# Patient Record
Sex: FEMALE | Race: BLACK OR AFRICAN AMERICAN | Employment: OTHER | URBAN - METROPOLITAN AREA
[De-identification: names, ages, dates, MRNs, and addresses within clinical notes are randomized per-mention and may not be internally consistent; named-entity substitution may affect disease eponyms.]

---

## 2017-01-13 ENCOUNTER — HOSPITAL ENCOUNTER (INPATIENT)
Age: 62
LOS: 7 days | Discharge: HOME | DRG: 418 | End: 2017-01-20
Attending: EMERGENCY MEDICINE | Admitting: INTERNAL MEDICINE
Payer: COMMERCIAL

## 2017-01-13 ENCOUNTER — APPOINTMENT (OUTPATIENT)
Dept: CT IMAGING | Age: 62
DRG: 418 | End: 2017-01-13
Attending: EMERGENCY MEDICINE
Payer: COMMERCIAL

## 2017-01-13 DIAGNOSIS — R10.13 ABDOMINAL PAIN, EPIGASTRIC: ICD-10-CM

## 2017-01-13 DIAGNOSIS — N30.00 ACUTE CYSTITIS WITHOUT HEMATURIA: ICD-10-CM

## 2017-01-13 DIAGNOSIS — R11.2 NON-INTRACTABLE VOMITING WITH NAUSEA, UNSPECIFIED VOMITING TYPE: Primary | ICD-10-CM

## 2017-01-13 DIAGNOSIS — N28.9 ACUTE RENAL INSUFFICIENCY: ICD-10-CM

## 2017-01-13 PROBLEM — N39.0 UTI (URINARY TRACT INFECTION): Status: ACTIVE | Noted: 2017-01-13

## 2017-01-13 LAB
ALBUMIN SERPL BCP-MCNC: 4 G/DL (ref 3.5–5)
ALBUMIN/GLOB SERPL: 1 {RATIO} (ref 1.1–2.2)
ALP SERPL-CCNC: 130 U/L (ref 45–117)
ALT SERPL-CCNC: 22 U/L (ref 12–78)
ANION GAP BLD CALC-SCNC: 12 MMOL/L (ref 5–15)
APPEARANCE UR: ABNORMAL
AST SERPL W P-5'-P-CCNC: 17 U/L (ref 15–37)
BACTERIA URNS QL MICRO: ABNORMAL /HPF
BASOPHILS # BLD AUTO: 0.1 K/UL (ref 0–0.1)
BASOPHILS # BLD: 1 % (ref 0–1)
BILIRUB SERPL-MCNC: 0.9 MG/DL (ref 0.2–1)
BILIRUB UR QL: NEGATIVE
BUN SERPL-MCNC: 25 MG/DL (ref 6–20)
BUN/CREAT SERPL: 11 (ref 12–20)
CALCIUM SERPL-MCNC: 8.6 MG/DL (ref 8.5–10.1)
CHLORIDE SERPL-SCNC: 103 MMOL/L (ref 97–108)
CO2 SERPL-SCNC: 24 MMOL/L (ref 21–32)
COLOR UR: ABNORMAL
CREAT SERPL-MCNC: 2.18 MG/DL (ref 0.55–1.02)
EOSINOPHIL # BLD: 0.1 K/UL (ref 0–0.4)
EOSINOPHIL NFR BLD: 1 % (ref 0–7)
EPITH CASTS URNS QL MICRO: ABNORMAL /LPF
ERYTHROCYTE [DISTWIDTH] IN BLOOD BY AUTOMATED COUNT: 13.1 % (ref 11.5–14.5)
GLOBULIN SER CALC-MCNC: 4.2 G/DL (ref 2–4)
GLUCOSE SERPL-MCNC: 90 MG/DL (ref 65–100)
GLUCOSE UR STRIP.AUTO-MCNC: NEGATIVE MG/DL
HCT VFR BLD AUTO: 40.7 % (ref 35–47)
HGB BLD-MCNC: 13.7 G/DL (ref 11.5–16)
HGB UR QL STRIP: ABNORMAL
KETONES UR QL STRIP.AUTO: ABNORMAL MG/DL
LEUKOCYTE ESTERASE UR QL STRIP.AUTO: ABNORMAL
LIPASE SERPL-CCNC: 122 U/L (ref 73–393)
LYMPHOCYTES # BLD AUTO: 31 % (ref 12–49)
LYMPHOCYTES # BLD: 2.7 K/UL (ref 0.8–3.5)
MCH RBC QN AUTO: 29.5 PG (ref 26–34)
MCHC RBC AUTO-ENTMCNC: 33.7 G/DL (ref 30–36.5)
MCV RBC AUTO: 87.5 FL (ref 80–99)
MONOCYTES # BLD: 0.7 K/UL (ref 0–1)
MONOCYTES NFR BLD AUTO: 8 % (ref 5–13)
NEUTS SEG # BLD: 5.3 K/UL (ref 1.8–8)
NEUTS SEG NFR BLD AUTO: 59 % (ref 32–75)
NITRITE UR QL STRIP.AUTO: NEGATIVE
PH UR STRIP: 6 [PH] (ref 5–8)
PLATELET # BLD AUTO: 192 K/UL (ref 150–400)
POTASSIUM SERPL-SCNC: 3.7 MMOL/L (ref 3.5–5.1)
PROT SERPL-MCNC: 8.2 G/DL (ref 6.4–8.2)
PROT UR STRIP-MCNC: ABNORMAL MG/DL
RBC # BLD AUTO: 4.65 M/UL (ref 3.8–5.2)
RBC #/AREA URNS HPF: ABNORMAL /HPF (ref 0–5)
SODIUM SERPL-SCNC: 139 MMOL/L (ref 136–145)
SP GR UR REFRACTOMETRY: 1.03 (ref 1–1.03)
TROPONIN I SERPL-MCNC: <0.04 NG/ML
UA: UC IF INDICATED,UAUC: ABNORMAL
UROBILINOGEN UR QL STRIP.AUTO: 1 EU/DL (ref 0.2–1)
WBC # BLD AUTO: 8.9 K/UL (ref 3.6–11)
WBC URNS QL MICRO: ABNORMAL /HPF (ref 0–4)

## 2017-01-13 PROCEDURE — 74011250637 HC RX REV CODE- 250/637: Performed by: EMERGENCY MEDICINE

## 2017-01-13 PROCEDURE — 36415 COLL VENOUS BLD VENIPUNCTURE: CPT | Performed by: INTERNAL MEDICINE

## 2017-01-13 PROCEDURE — 74011000250 HC RX REV CODE- 250: Performed by: EMERGENCY MEDICINE

## 2017-01-13 PROCEDURE — 74176 CT ABD & PELVIS W/O CONTRAST: CPT

## 2017-01-13 PROCEDURE — 74011250636 HC RX REV CODE- 250/636: Performed by: INTERNAL MEDICINE

## 2017-01-13 PROCEDURE — 87086 URINE CULTURE/COLONY COUNT: CPT | Performed by: EMERGENCY MEDICINE

## 2017-01-13 PROCEDURE — 85025 COMPLETE CBC W/AUTO DIFF WBC: CPT | Performed by: EMERGENCY MEDICINE

## 2017-01-13 PROCEDURE — 74011250637 HC RX REV CODE- 250/637: Performed by: INTERNAL MEDICINE

## 2017-01-13 PROCEDURE — 81001 URINALYSIS AUTO W/SCOPE: CPT | Performed by: EMERGENCY MEDICINE

## 2017-01-13 PROCEDURE — 96374 THER/PROPH/DIAG INJ IV PUSH: CPT

## 2017-01-13 PROCEDURE — 80053 COMPREHEN METABOLIC PANEL: CPT | Performed by: EMERGENCY MEDICINE

## 2017-01-13 PROCEDURE — 99284 EMERGENCY DEPT VISIT MOD MDM: CPT

## 2017-01-13 PROCEDURE — 96361 HYDRATE IV INFUSION ADD-ON: CPT

## 2017-01-13 PROCEDURE — 74011636320 HC RX REV CODE- 636/320: Performed by: EMERGENCY MEDICINE

## 2017-01-13 PROCEDURE — 65270000029 HC RM PRIVATE

## 2017-01-13 PROCEDURE — 74011250636 HC RX REV CODE- 250/636: Performed by: EMERGENCY MEDICINE

## 2017-01-13 PROCEDURE — 74011000258 HC RX REV CODE- 258: Performed by: EMERGENCY MEDICINE

## 2017-01-13 PROCEDURE — 83690 ASSAY OF LIPASE: CPT | Performed by: EMERGENCY MEDICINE

## 2017-01-13 PROCEDURE — 84484 ASSAY OF TROPONIN QUANT: CPT | Performed by: EMERGENCY MEDICINE

## 2017-01-13 PROCEDURE — 96375 TX/PRO/DX INJ NEW DRUG ADDON: CPT

## 2017-01-13 RX ORDER — HEPARIN SODIUM 5000 [USP'U]/ML
5000 INJECTION, SOLUTION INTRAVENOUS; SUBCUTANEOUS EVERY 8 HOURS
Status: DISCONTINUED | OUTPATIENT
Start: 2017-01-13 | End: 2017-01-20 | Stop reason: HOSPADM

## 2017-01-13 RX ORDER — SODIUM CHLORIDE 9 MG/ML
100 INJECTION, SOLUTION INTRAVENOUS CONTINUOUS
Status: DISCONTINUED | OUTPATIENT
Start: 2017-01-13 | End: 2017-01-13

## 2017-01-13 RX ORDER — FAMOTIDINE 10 MG/ML
20 INJECTION INTRAVENOUS
Status: COMPLETED | OUTPATIENT
Start: 2017-01-13 | End: 2017-01-13

## 2017-01-13 RX ORDER — ONDANSETRON 2 MG/ML
4 INJECTION INTRAMUSCULAR; INTRAVENOUS
Status: DISCONTINUED | OUTPATIENT
Start: 2017-01-13 | End: 2017-01-20 | Stop reason: HOSPADM

## 2017-01-13 RX ORDER — MORPHINE SULFATE 2 MG/ML
4 INJECTION, SOLUTION INTRAMUSCULAR; INTRAVENOUS
Status: COMPLETED | OUTPATIENT
Start: 2017-01-13 | End: 2017-01-13

## 2017-01-13 RX ORDER — HYDRALAZINE HYDROCHLORIDE 20 MG/ML
10 INJECTION INTRAMUSCULAR; INTRAVENOUS
Status: DISCONTINUED | OUTPATIENT
Start: 2017-01-13 | End: 2017-01-20 | Stop reason: HOSPADM

## 2017-01-13 RX ORDER — SODIUM CHLORIDE 0.9 % (FLUSH) 0.9 %
5-10 SYRINGE (ML) INJECTION EVERY 8 HOURS
Status: DISCONTINUED | OUTPATIENT
Start: 2017-01-13 | End: 2017-01-14

## 2017-01-13 RX ORDER — SODIUM CHLORIDE 9 MG/ML
100 INJECTION, SOLUTION INTRAVENOUS CONTINUOUS
Status: DISCONTINUED | OUTPATIENT
Start: 2017-01-13 | End: 2017-01-19

## 2017-01-13 RX ORDER — ONDANSETRON 2 MG/ML
4 INJECTION INTRAMUSCULAR; INTRAVENOUS
Status: COMPLETED | OUTPATIENT
Start: 2017-01-13 | End: 2017-01-13

## 2017-01-13 RX ORDER — GABAPENTIN 100 MG/1
100 CAPSULE ORAL 3 TIMES DAILY
Status: DISCONTINUED | OUTPATIENT
Start: 2017-01-13 | End: 2017-01-20 | Stop reason: HOSPADM

## 2017-01-13 RX ORDER — SODIUM CHLORIDE 0.9 % (FLUSH) 0.9 %
5-10 SYRINGE (ML) INJECTION AS NEEDED
Status: DISCONTINUED | OUTPATIENT
Start: 2017-01-13 | End: 2017-01-20 | Stop reason: HOSPADM

## 2017-01-13 RX ADMIN — ONDANSETRON 4 MG: 2 INJECTION INTRAMUSCULAR; INTRAVENOUS at 23:53

## 2017-01-13 RX ADMIN — Medication 4 MG: at 17:13

## 2017-01-13 RX ADMIN — ONDANSETRON 4 MG: 2 INJECTION INTRAMUSCULAR; INTRAVENOUS at 22:11

## 2017-01-13 RX ADMIN — ONDANSETRON HYDROCHLORIDE 4 MG: 2 INJECTION, SOLUTION INTRAVENOUS at 17:13

## 2017-01-13 RX ADMIN — GABAPENTIN 100 MG: 100 CAPSULE ORAL at 22:55

## 2017-01-13 RX ADMIN — SODIUM CHLORIDE 100 ML/HR: 900 INJECTION, SOLUTION INTRAVENOUS at 22:51

## 2017-01-13 RX ADMIN — FAMOTIDINE 20 MG: 10 INJECTION, SOLUTION INTRAVENOUS at 22:15

## 2017-01-13 RX ADMIN — LIDOCAINE HYDROCHLORIDE 40 ML: 20 SOLUTION ORAL; TOPICAL at 22:09

## 2017-01-13 RX ADMIN — Medication 4 MG: at 22:21

## 2017-01-13 RX ADMIN — HEPARIN SODIUM 5000 UNITS: 5000 INJECTION, SOLUTION INTRAVENOUS; SUBCUTANEOUS at 22:17

## 2017-01-13 RX ADMIN — SODIUM CHLORIDE 1000 ML: 900 INJECTION, SOLUTION INTRAVENOUS at 17:13

## 2017-01-13 RX ADMIN — DIATRIZOATE MEGLUMINE AND DIATRIZOATE SODIUM 30 ML: 600; 100 SOLUTION ORAL; RECTAL at 17:13

## 2017-01-13 RX ADMIN — CEFTRIAXONE 1 G: 1 INJECTION, POWDER, FOR SOLUTION INTRAMUSCULAR; INTRAVENOUS at 22:20

## 2017-01-13 NOTE — ED NOTES
Assumed care of patient, Dr Colleen Liao bedside evaluating patient-- pt to change into gown, urine specimen sent to lab, plan of care include IV and fluids, possible CT scan- pt prefers that her  who is with her remains in the waiting room   +

## 2017-01-13 NOTE — IP AVS SNAPSHOT
Current Discharge Medication List  
  
Take these medications at their scheduled times Dose & Instructions Dispensing Information Comments Morning Noon Evening Bedtime AVAPRO 300 mg tablet Generic drug:  irbesartan Your next dose is: Today, Tomorrow Other:  ____________ Dose:  300 mg Take 300 mg by mouth daily. Refills:  0  
     
   
   
   
  
 docusate sodium 100 mg capsule Commonly known as:  Ledell Sadie Your next dose is: Today, Tomorrow Other:  ____________ Dose:  100 mg Take 1 Cap by mouth two (2) times a day for 14 days. Stop taking if you have diarrhea Quantity:  30 Cap Refills:  2  
     
   
   
   
  
 gabapentin 100 mg capsule Commonly known as:  NEURONTIN Your next dose is: Today, Tomorrow Other:  ____________ Dose:  100 mg Take 100 mg by mouth three (3) times daily. Refills:  0 PREVACID 30 mg capsule Generic drug:  lansoprazole Your next dose is: Today, Tomorrow Other:  ____________ Dose:  20 mg Take 20 mg by mouth Daily (before breakfast). Refills:  0 Take these medications as needed Dose & Instructions Dispensing Information Comments Morning Noon Evening Bedtime  
 ibuprofen 600 mg tablet Commonly known as:  MOTRIN Your next dose is: Today, Tomorrow Other:  ____________ Dose:  600 mg Take 1 Tab by mouth every six (6) hours as needed for Pain. Quantity:  40 Tab Refills:  0  
     
   
   
   
  
 oxyCODONE-acetaminophen 5-325 mg per tablet Commonly known as:  PERCOCET Your next dose is: Today, Tomorrow Other:  ____________ Dose:  1-2 Tab Take 1-2 Tabs by mouth every four (4) hours as needed for Pain. Max Daily Amount: 12 Tabs. Quantity:  40 Tab Refills:  0 Where to Get Your Medications These medications were sent to [facility] at [facility], [address] [facility] [id number], [phone] [id number] Phone:  [phone]  
  docusate sodium 100 mg capsule  
 ibuprofen 600 mg tablet Information about where to get these medications is not yet available ! Ask your nurse or doctor about these medications  
  oxyCODONE-acetaminophen 5-325 mg per tablet

## 2017-01-13 NOTE — ED PROVIDER NOTES
HPI Comments:   Taniya Mart is a 62 y.o. female with a hx of acid reflux, HTN, and fibromyalgia presenting to the ED C/O epigastric abd pain which started 4 days ago. Associated symptoms include chills, nausea, and vomiting. Pt had 5 episodes of vomiting this week and another 3 just after arrival to the ED. She took Zofran just PTA for the symptoms. Pt states her  had similar symptoms 1.5 weeks ago but thought it was because of an allergic reaction to peanuts. PSHx significant for . Patient denies diarrhea, hematochezia, fever, or any other symptoms or complaints. PCP: Christie Harris MD    There are no other complaints, changes or physical findings at this time. Written by CONNIE Pop, as dictated by Sandra Walden MD      The history is provided by the patient. No  was used. Past Medical History:   Diagnosis Date    Gastrointestinal disorder      acid reflux    Hypertension     Other ill-defined conditions(799.89)      fibromyalgia       Past Surgical History:   Procedure Laterality Date    Hx gyn       , tubal ligation    Pr gerd tst w/ mucos impede electrod,>1hr  2016          Pr esophageal motility study w/interp&rpt  2016               History reviewed. No pertinent family history. Social History     Social History    Marital status:      Spouse name: N/A    Number of children: N/A    Years of education: N/A     Occupational History    Not on file. Social History Main Topics    Smoking status: Never Smoker    Smokeless tobacco: Not on file    Alcohol use No    Drug use: Not on file    Sexual activity: Not on file     Other Topics Concern    Not on file     Social History Narrative         ALLERGIES: Pcn [penicillins]    Review of Systems   Constitutional: Positive for chills. Negative for activity change and fever. HENT: Negative for congestion and sore throat.     Eyes: Negative for pain and redness. Respiratory: Negative for cough, chest tightness and shortness of breath. Cardiovascular: Negative for chest pain and palpitations. Gastrointestinal: Positive for abdominal pain (epigastric), nausea and vomiting. Negative for blood in stool and diarrhea. Genitourinary: Negative for dysuria, frequency and urgency. Musculoskeletal: Negative for back pain and neck pain. Skin: Negative for rash. Neurological: Negative for syncope, light-headedness and headaches. Psychiatric/Behavioral: Negative for confusion. All other systems reviewed and are negative. Vitals:    01/13/17 1457 01/13/17 1547 01/13/17 1937   BP: 140/65 125/51 160/86   Pulse: 61 61 (!) 53   Resp: 16 16 16   Temp: 97.4 °F (36.3 °C) 97.9 °F (36.6 °C)    SpO2: 99% 100% 100%   Weight: 82.5 kg (181 lb 14.1 oz)     Height: 5' 7\" (1.702 m)              Physical Exam   Constitutional: She is oriented to person, place, and time. She appears well-developed and well-nourished. No distress. HENT:   Head: Normocephalic. Nose: Nose normal.   Mouth/Throat: Oropharynx is clear and moist. No oropharyngeal exudate. Eyes: Conjunctivae are normal. Pupils are equal, round, and reactive to light. No scleral icterus. Neck: Normal range of motion. Neck supple. No JVD present. No tracheal deviation present. No thyromegaly present. Cardiovascular: Normal rate, regular rhythm and intact distal pulses. Exam reveals no gallop and no friction rub. No murmur heard. Pulmonary/Chest: Effort normal and breath sounds normal. No stridor. No respiratory distress. She has no wheezes. She has no rales. Abdominal: Soft. Bowel sounds are normal. She exhibits no distension. There is tenderness (mild) in the epigastric area. There is guarding (minimal, voluntary). There is no rebound. Musculoskeletal: Normal range of motion. She exhibits no edema. Lymphadenopathy:     She has no cervical adenopathy.    Neurological: She is alert and oriented to person, place, and time. No cranial nerve deficit. She exhibits normal muscle tone. Coordination normal.   Skin: Skin is warm and dry. No rash noted. She is not diaphoretic. No erythema. Psychiatric: She has a normal mood and affect. Her behavior is normal.   Nursing note and vitals reviewed. MDM  Number of Diagnoses or Management Options  Abdominal pain, epigastric:   Acute cystitis without hematuria:   Acute renal insufficiency:   Non-intractable vomiting with nausea, unspecified vomiting type:   Diagnosis management comments: DDx: gastroenteritis, gastritis, pancreatitis, metabolic abnormality       Amount and/or Complexity of Data Reviewed  Clinical lab tests: ordered and reviewed  Tests in the radiology section of CPT®: ordered and reviewed  Discuss the patient with other providers: yes (Hospitalist)    Risk of Complications, Morbidity, and/or Mortality  General comments: Will admit to hospitalist for acute renal insufficiency; most likely prerenal due to n/v and decreased po intake; abd pelvis ct without acute pathology; no increased wbc; +UTI with 10-20 wbc and 2+bacteria;  Jules Wick MD      Patient Progress  Patient progress: stable    Procedures    CONSULT NOTE:   7:55 PM  Jules Wick MD spoke with Dr. Thalia Da Silva,   Specialty: Hospitalist  Discussed pt's hx, disposition, and available diagnostic and imaging results. Reviewed care plans. Consultant will evaluate pt for admission.   Written by Tejal Mccoy ED Scribe, as dictated by Jules Wick MD.    LABORATORY TESTS:  Recent Results (from the past 12 hour(s))   CBC WITH AUTOMATED DIFF    Collection Time: 01/13/17  3:27 PM   Result Value Ref Range    WBC 8.9 3.6 - 11.0 K/uL    RBC 4.65 3.80 - 5.20 M/uL    HGB 13.7 11.5 - 16.0 g/dL    HCT 40.7 35.0 - 47.0 %    MCV 87.5 80.0 - 99.0 FL    MCH 29.5 26.0 - 34.0 PG    MCHC 33.7 30.0 - 36.5 g/dL    RDW 13.1 11.5 - 14.5 %    PLATELET 123 210 - 006 K/uL    NEUTROPHILS 59 32 - 75 % LYMPHOCYTES 31 12 - 49 %    MONOCYTES 8 5 - 13 %    EOSINOPHILS 1 0 - 7 %    BASOPHILS 1 0 - 1 %    ABS. NEUTROPHILS 5.3 1.8 - 8.0 K/UL    ABS. LYMPHOCYTES 2.7 0.8 - 3.5 K/UL    ABS. MONOCYTES 0.7 0.0 - 1.0 K/UL    ABS. EOSINOPHILS 0.1 0.0 - 0.4 K/UL    ABS. BASOPHILS 0.1 0.0 - 0.1 K/UL   METABOLIC PANEL, COMPREHENSIVE    Collection Time: 01/13/17  3:27 PM   Result Value Ref Range    Sodium 139 136 - 145 mmol/L    Potassium 3.7 3.5 - 5.1 mmol/L    Chloride 103 97 - 108 mmol/L    CO2 24 21 - 32 mmol/L    Anion gap 12 5 - 15 mmol/L    Glucose 90 65 - 100 mg/dL    BUN 25 (H) 6 - 20 MG/DL    Creatinine 2.18 (H) 0.55 - 1.02 MG/DL    BUN/Creatinine ratio 11 (L) 12 - 20      GFR est AA 28 (L) >60 ml/min/1.73m2    GFR est non-AA 23 (L) >60 ml/min/1.73m2    Calcium 8.6 8.5 - 10.1 MG/DL    Bilirubin, total 0.9 0.2 - 1.0 MG/DL    ALT 22 12 - 78 U/L    AST 17 15 - 37 U/L    Alk.  phosphatase 130 (H) 45 - 117 U/L    Protein, total 8.2 6.4 - 8.2 g/dL    Albumin 4.0 3.5 - 5.0 g/dL    Globulin 4.2 (H) 2.0 - 4.0 g/dL    A-G Ratio 1.0 (L) 1.1 - 2.2     LIPASE    Collection Time: 01/13/17  3:27 PM   Result Value Ref Range    Lipase 122 73 - 393 U/L   TROPONIN I    Collection Time: 01/13/17  3:27 PM   Result Value Ref Range    Troponin-I, Qt. <0.04 <0.05 ng/mL   URINALYSIS W/ REFLEX CULTURE    Collection Time: 01/13/17  4:55 PM   Result Value Ref Range    Color YELLOW/STRAW      Appearance TURBID (A) CLEAR      Specific gravity 1.026 1.003 - 1.030      pH (UA) 6.0 5.0 - 8.0      Protein TRACE (A) NEG mg/dL    Glucose NEGATIVE  NEG mg/dL    Ketone TRACE (A) NEG mg/dL    Bilirubin NEGATIVE  NEG      Blood TRACE (A) NEG      Urobilinogen 1.0 0.2 - 1.0 EU/dL    Nitrites NEGATIVE  NEG      Leukocyte Esterase MODERATE (A) NEG      WBC 10-20 0 - 4 /hpf    RBC 0-5 0 - 5 /hpf    Epithelial cells MODERATE (A) FEW /lpf    Bacteria 2+ (A) NEG /hpf    UA:UC IF INDICATED URINE CULTURE ORDERED (A) CNI         IMAGING RESULTS:  CT ABD PELV WO CONT Final Result   INDICATION: upper abd pain; po contrast only;     COMPARISON:     TECHNIQUE:   Thin axial images were obtained through the abdomen and pelvis. Coronal and  sagittal reconstructions were generated. Oral contrast was not administered. CT  dose reduction was achieved through use of a standardized protocol tailored for  this examination and automatic exposure control for dose modulation.      The absence of intravenous contrast material reduces the sensitivity for  evaluation of the solid parenchymal organs of the abdomen.      FINDINGS:   LUNG BASES: Clear. INCIDENTALLY IMAGED HEART AND MEDIASTINUM: Unremarkable. LIVER: No mass or biliary dilatation. GALLBLADDER: Unremarkable. SPLEEN: No mass. PANCREAS: No mass or ductal dilatation. ADRENALS: Unremarkable. KIDNEYS/URETERS: No mass, calculus, or hydronephrosis. STOMACH: Unremarkable. SMALL BOWEL: No dilatation or wall thickening. COLON: No dilatation or wall thickening. There is diverticulosis. APPENDIX: Unremarkable. PERITONEUM: No ascites or pneumoperitoneum. RETROPERITONEUM: No lymphadenopathy or aortic aneurysm. REPRODUCTIVE ORGANS: Normal  URINARY BLADDER: No mass or calculus. BONES: No destructive bone lesion. There is facet arthropathy. ADDITIONAL COMMENTS: N/A     IMPRESSION  IMPRESSION:  No acute findings.   Signed by      Signed Date/Time    Phone Pager     Mayra File 1/13/2017 19:05 [phone]           MEDICATIONS GIVEN:  Medications   0.9% sodium chloride infusion (not administered)   cefTRIAXone (ROCEPHIN) 1 g in 0.9% sodium chloride (MBP/ADV) 50 mL (not administered)   0.9% sodium chloride infusion (not administered)   diatrizoate meglumine-d.sodium (MD-GASTROVIEW,GASTROGRAFIN) 66-10 % contrast solution 30 mL (30 mL Oral Given 1/13/17 1713)   morphine injection 4 mg (4 mg IntraVENous Given 1/13/17 1713)   ondansetron (ZOFRAN) injection 4 mg (4 mg IntraVENous Given 1/13/17 1713)   sodium chloride 0.9 % bolus infusion 1,000 mL (1,000 mL IntraVENous New Bag 1/13/17 2325)       IMPRESSION:  1. Non-intractable vomiting with nausea, unspecified vomiting type    2. Abdominal pain, epigastric    3. Acute renal insufficiency    4. Acute cystitis without hematuria        PLAN: Admit to Hospitalist    Admit Note:  7:55 PM  Patient is being admitted to the hospital by Dr. Erin Hernandez. The results of their tests and reasons for their admission have been discussed with the patient and/or available family. They convey their agreement and understanding for the need to be admitted and for their admission diagnosis. Written by Syliva Kocher, ED Scribe, as dictated by Waldo Dalton MD.     Attestation: This note is prepared by Syliva Kocher, acting as Scribe for Waldo Dalton MD.    Waldo Dalton MD: The scribe's documentation has been prepared under my direction and personally reviewed by me in its entirety. I confirm that the note above accurately reflects all work, treatment, procedures, and medical decision making performed by me.

## 2017-01-13 NOTE — IP AVS SNAPSHOT
[address] [facility] 
[phone] Patient: Luis Silva MRN: [id number] PXH:[id number] You are allergic to the following Allergen Reactions Pcn (Penicillins) Hives Has had rocephin and ancef without reaction. Recent Documentation Height Weight BMI OB Status Smoking Status 1.702 m 82.5 kg 28.49 kg/m2 Menopause Never Smoker Emergency Contacts Name Discharge Info Relation Home Work Mobile [address] CAREGIVER [3] Spouse [3] [phone] About your hospitalization You were admitted on:  January 13, 2017 You last received care in the:  [facility] 3 RENAL MEDICINE You were discharged on:  January 20, 2017 Unit phone number:  [phone] Why you were hospitalized Your primary diagnosis was:  Not on File Your diagnoses also included:  Uti (Urinary Tract Infection) Providers Seen During Your Hospitalizations Provider Role Specialty Primary office phone Ya Shea MD Attending Provider Emergency Medicine [phone] Jose Francisco Barker MD Attending Provider Internal Medicine [phone] Yahaira Molina MD Attending Provider Hospitalist [phone] Your Primary Care Physician (PCP) Primary Care Physician Office Phone Office Fax Vikki [address] [phone] Follow-up Information Follow up With Details Comments Contact Info Sharyn Bennett MD Schedule an appointment as soon as possible for a visit in 1 week  [place] [address] 
[phone] Wilson Cifuentes MD Schedule an appointment as soon as possible for a visit in 2 weeks  [address] [facility] 3 [address] [facility] 
[phone] Current Discharge Medication List  
  
START taking these medications Dose & Instructions Dispensing Information Comments Morning Noon Evening Bedtime  
 docusate sodium 100 mg capsule Commonly known as:  Odessa Gilbert Your next dose is: Today, Tomorrow Other:  _________ Dose:  100 mg Take 1 Cap by mouth two (2) times a day for 14 days. Stop taking if you have diarrhea Quantity:  30 Cap Refills:  2  
     
   
   
   
  
 ibuprofen 600 mg tablet Commonly known as:  MOTRIN Your next dose is: Today, Tomorrow Other:  _________ Dose:  600 mg Take 1 Tab by mouth every six (6) hours as needed for Pain. Quantity:  40 Tab Refills:  0  
     
   
   
   
  
 oxyCODONE-acetaminophen 5-325 mg per tablet Commonly known as:  PERCOCET Your next dose is: Today, Tomorrow Other:  _________ Dose:  1-2 Tab Take 1-2 Tabs by mouth every four (4) hours as needed for Pain. Max Daily Amount: 12 Tabs. Quantity:  40 Tab Refills:  0 CONTINUE these medications which have NOT CHANGED Dose & Instructions Dispensing Information Comments Morning Noon Evening Bedtime AVAPRO 300 mg tablet Generic drug:  irbesartan Your next dose is: Today, Tomorrow Other:  _________ Dose:  300 mg Take 300 mg by mouth daily. Refills:  0  
     
   
   
   
  
 gabapentin 100 mg capsule Commonly known as:  NEURONTIN Your next dose is: Today, Tomorrow Other:  _________ Dose:  100 mg Take 100 mg by mouth three (3) times daily. Refills:  0 PREVACID 30 mg capsule Generic drug:  lansoprazole Your next dose is: Today, Tomorrow Other:  _________ Dose:  20 mg Take 20 mg by mouth Daily (before breakfast). Refills:  0 STOP taking these medications   
 acetaminophen 500 mg tablet Commonly known as:  TYLENOL  
   
  
 metoprolol tartrate 100 mg IR tablet Commonly known as:  LOPRESSOR Where to Get Your Medications These medications were sent to [facility] at [facility], [address] [facility], [address] [address] Phone:  [phone]  
  docusate sodium 100 mg capsule  
 ibuprofen 600 mg tablet Information on where to get these meds will be given to you by the nurse or doctor. ! Ask your nurse or doctor about these medications  
  oxyCODONE-acetaminophen 5-325 mg per tablet Discharge Instructions Dr Jeremisa Gonzalez Discharge Instructions after Laparoscopic Cholecystectomy Anabell Win Renee/ [id number] : 1955 Admitted 2017 Discharged: 2017 What to do at [facility] Recommended diet: Low Fat Diet for 1 month Recommended activity: as tolerated, do not drive for 3-5 days while on pain medicine. Follow-up with Dr. Christa Spencer in 2 weeks. Call [phone] for an appointment. Hold lopressor or metoprolol till you see your PCP Follow-up Information Follow up With Details Comments Contact Info David Day MD Schedule an appointment as soon as possible for a visit in 1 week  [facility] [address] 
[phone] Kaia Lisa MD Schedule an appointment as soon as possible for a visit in 2 weeks  [address] [facility] [address] [place] [address] 
[phone] Cholecystectomy: What to Expect at [facility] Your Recovery After your surgery, it is normal to feel weak and tired for several days after you return home. Your belly may be swollen. If you had laparoscopic surgery, you may also have pain in your shoulder for about 24 hours. You may have gas or need to burp a lot at first, and a few people get diarrhea. The diarrhea usually goes away in 2 to 4 weeks, but it may last longer. How quickly you recover depends on whether you had a laparoscopic or open surgery. For a laparoscopic surgery, most people can go back to work or their normal routine in 1 to 2 weeks, but it may take longer, depending on the type of work you do. For an open surgery, it will probably take 4 to 6 weeks before you get back to your normal routine. This care sheet gives you a general idea about how long it will take for you to recover. However, each person recovers at a different pace. Follow the steps below to get better as quickly as possible. How can you care for yourself at home? Activity Rest when you feel tired. Getting enough sleep will help you recover. Try to walk each day. Start out by walking a little more than you did the day before. Gradually increase the amount you walk. Walking boosts blood flow and helps prevent pneumonia and constipation. Avoid strenuous activities, such as biking, jogging, weightlifting, and aerobic exercise, for 1-2 weeks. You may shower 24 hours after surgery. Pat the cut (incision) dry. Do not take a bath for the first 2 weeks, or until your doctor tells you it is okay. You may drive when you are no longer taking pain medicine and can quickly move your foot from the gas pedal to the brake. You must also be able to sit comfortably for a long period of time, even if you do not plan to go far. For a laparoscopic surgery, most people can go back to work or their normal routine in 1 to 2 weeks, but it may take longer. For an open surgery, it will probably take 4 to 6 weeks before you get back to your normal routine. Diet Eat smaller meals more often instead of fewer larger meals. You can eat a normal diet, but avoid eating fatty foods for about 1 month. Fatty foods include hamburger, whole milk, cheese, and many snack foods. If your stomach is upset, try bland, low-fat foods like plain rice, broiled chicken, toast, and yogurt. Drink plenty of fluids (unless your doctor tells you not to). If you have diarrhea, try avoiding spicy foods, dairy products, fatty foods, and alcohol. You can also watch to see if specific foods cause it, and stop eating them. If the diarrhea continues for more than 2 weeks, talk to your doctor. You may notice that your bowel movements are not regular right after your surgery. This is common. Try to avoid constipation and straining with bowel movements. You may want to take a fiber supplement every day. If you have not had a bowel movement after a couple of days, ask your doctor about taking a mild laxative. Medicines Take pain medicines exactly as directed. If the doctor gave you a prescription medicine for pain, take it as prescribed. If you are not taking a prescription pain medicine, take an over-the-counter medicine such as acetaminophen (Tylenol), ibuprofen (Advil, Motrin), or naproxen (Aleve). Read and follow all instructions on the label. Do not take two or more pain medicines at the same time unless the doctor told you to. Many pain medicines contain acetaminophen, which is Tylenol. Too much Tylenol can be harmful. If you think your pain medicine is making you sick to your stomach: Take your medicine after meals (unless your doctor tells you not to). Ask your doctor for a different pain medicine. If your doctor prescribed antibiotics, take them as directed. Do not stop taking them just because you feel better. You need to take the full course of antibiotics. Incision care After 24 to 48 hours, wash the area daily with warm, soapy water, and pat it dry. You may have staples to hold the cut together. Keep them dry until your doctor takes them out. This is usually in 7 to 10 days. Keep the area clean and dry. You may cover it with a gauze bandage if it weeps or rubs against clothing. Change the bandage every day.  
 
Ice 
 To reduce swelling and pain, put ice or a cold pack on your belly for 10 to 15 minutes at a time. Do this every 1 to 2 hours. Put a thin cloth between the ice and your skin. Follow-up care is a key part of your treatment and safety. Be sure to make and go to all appointments, and call your doctor if you are having problems. Its also a good idea to know your test results and keep a list of the medicines you take. When should you call for help? Call 911 anytime you think you may need emergency care. For example, call if: You pass out (lose consciousness). You have severe trouble breathing. You have sudden chest pain and shortness of breath, or you cough up blood. Call your doctor now or seek immediate medical care if: 
You are sick to your stomach and cannot drink fluids. You have pain that does not get better when you take your pain medicine. You have signs of infection, such as: Increased pain, warmth, or excessive (>1inch) redness. Red streaks leading from the incision. Pus draining from the incision. Swollen lymph nodes in your neck, armpits, or groin. A fever. Your urine turns dark brown or your stool is light-colored or andrae-colored. Your skin turns yellow. Bright red blood has soaked through a large bandage over your incision. You have signs of a blood clot, such as: 
Pain in your calf, back of knee, thigh, or groin. Redness and swelling in your leg or groin. You have trouble passing urine or stool, especially if you have mild pain or swelling in your lower belly. Watch closely for any changes in your health, and be sure to contact your doctor if: You do not have a bowel movement after taking a laxative. Discharge Orders None [facility] Announcement We are excited to announce that we are making your provider's discharge notes available to you in [employer].   You will see these notes when they are completed and signed by the physician that discharged you from your recent hospital stay. If you have any questions or concerns about any information you see in [employer], please call the Health Information Department where you were seen or reach out to your Primary Care Provider for more information about your plan of care. Introducing [facility]! [facility] introduces [employer] patient portal. Now you can access parts of your medical record, email your doctor's office, and request medication refills online. 1. In your internet browser, go to [email or web address] 2. Click on the First Time User? Click Here link in the Sign In box. You will see the New Member Sign Up page. 3. Enter your [employer] Access Code exactly as it appears below. You will not need to use this code after youve completed the sign-up process. If you do not sign up before the expiration date, you must request a new code. · [employer] Access Code: [email or web address] Expires: 4/13/2017  3:04 PM 
 
4. Enter the last four digits of your Social Security Number (xxxx) and Date of Birth (mm/dd/yyyy) as indicated and click Submit. You will be taken to the next sign-up page. 5. Create a [employer] ID. This will be your [employer] login ID and cannot be changed, so think of one that is secure and easy to remember. 6. Create a [employer] password. You can change your password at any time. 7. Enter your Password Reset Question and Answer. This can be used at a later time if you forget your password. 8. Enter your e-mail address. You will receive e-mail notification when new information is available in [address] 9. Click Sign Up. You can now view and download portions of your medical record. 10. Click the Download Summary menu link to download a portable copy of your medical information.  
 
If you have questions, please visit the Frequently Asked Questions section of the [employer]. Remember, MyChart is NOT to be used for urgent needs. For medical emergencies, dial 911. Now available from your iPhone and Android! General Information Please provide this summary of care documentation to your next provider. Patient Signature:  ____________________________________________________________ Date:  ____________________________________________________________  
  
Aloma Snellen Provider Signature:  ____________________________________________________________ Date:  ____________________________________________________________

## 2017-01-14 LAB
ALBUMIN SERPL BCP-MCNC: 3.5 G/DL (ref 3.5–5)
ALBUMIN/GLOB SERPL: 1 {RATIO} (ref 1.1–2.2)
ALP SERPL-CCNC: 116 U/L (ref 45–117)
ALT SERPL-CCNC: 20 U/L (ref 12–78)
ANION GAP BLD CALC-SCNC: 10 MMOL/L (ref 5–15)
AST SERPL W P-5'-P-CCNC: 13 U/L (ref 15–37)
BASOPHILS # BLD AUTO: 0.1 K/UL (ref 0–0.1)
BASOPHILS # BLD: 1 % (ref 0–1)
BILIRUB SERPL-MCNC: 0.8 MG/DL (ref 0.2–1)
BUN SERPL-MCNC: 17 MG/DL (ref 6–20)
BUN/CREAT SERPL: 12 (ref 12–20)
CALCIUM SERPL-MCNC: 8.1 MG/DL (ref 8.5–10.1)
CHLORIDE SERPL-SCNC: 108 MMOL/L (ref 97–108)
CHOLEST SERPL-MCNC: 188 MG/DL
CO2 SERPL-SCNC: 22 MMOL/L (ref 21–32)
CREAT SERPL-MCNC: 1.4 MG/DL (ref 0.55–1.02)
EOSINOPHIL # BLD: 0 K/UL (ref 0–0.4)
EOSINOPHIL NFR BLD: 0 % (ref 0–7)
ERYTHROCYTE [DISTWIDTH] IN BLOOD BY AUTOMATED COUNT: 12.9 % (ref 11.5–14.5)
EST. AVERAGE GLUCOSE BLD GHB EST-MCNC: 114 MG/DL
GLOBULIN SER CALC-MCNC: 3.6 G/DL (ref 2–4)
GLUCOSE SERPL-MCNC: 105 MG/DL (ref 65–100)
HBA1C MFR BLD: 5.6 % (ref 4.2–6.3)
HCT VFR BLD AUTO: 35.6 % (ref 35–47)
HDLC SERPL-MCNC: 41 MG/DL
HDLC SERPL: 4.6 {RATIO} (ref 0–5)
HGB BLD-MCNC: 12 G/DL (ref 11.5–16)
LDLC SERPL CALC-MCNC: 130.4 MG/DL (ref 0–100)
LIPID PROFILE,FLP: ABNORMAL
LYMPHOCYTES # BLD AUTO: 22 % (ref 12–49)
LYMPHOCYTES # BLD: 1.9 K/UL (ref 0.8–3.5)
MCH RBC QN AUTO: 29.3 PG (ref 26–34)
MCHC RBC AUTO-ENTMCNC: 33.7 G/DL (ref 30–36.5)
MCV RBC AUTO: 87 FL (ref 80–99)
MONOCYTES # BLD: 0.4 K/UL (ref 0–1)
MONOCYTES NFR BLD AUTO: 5 % (ref 5–13)
NEUTS SEG # BLD: 6.1 K/UL (ref 1.8–8)
NEUTS SEG NFR BLD AUTO: 72 % (ref 32–75)
PLATELET # BLD AUTO: 169 K/UL (ref 150–400)
POTASSIUM SERPL-SCNC: 4.3 MMOL/L (ref 3.5–5.1)
PROT SERPL-MCNC: 7.1 G/DL (ref 6.4–8.2)
RBC # BLD AUTO: 4.09 M/UL (ref 3.8–5.2)
SODIUM SERPL-SCNC: 140 MMOL/L (ref 136–145)
TRIGL SERPL-MCNC: 83 MG/DL (ref ?–150)
TROPONIN I SERPL-MCNC: <0.04 NG/ML
TROPONIN I SERPL-MCNC: <0.04 NG/ML
VLDLC SERPL CALC-MCNC: 16.6 MG/DL
WBC # BLD AUTO: 8.5 K/UL (ref 3.6–11)

## 2017-01-14 PROCEDURE — 74011000258 HC RX REV CODE- 258: Performed by: INTERNAL MEDICINE

## 2017-01-14 PROCEDURE — 36415 COLL VENOUS BLD VENIPUNCTURE: CPT | Performed by: INTERNAL MEDICINE

## 2017-01-14 PROCEDURE — 74011250636 HC RX REV CODE- 250/636: Performed by: INTERNAL MEDICINE

## 2017-01-14 PROCEDURE — 74011250637 HC RX REV CODE- 250/637: Performed by: INTERNAL MEDICINE

## 2017-01-14 PROCEDURE — 74011250637 HC RX REV CODE- 250/637: Performed by: HOSPITALIST

## 2017-01-14 PROCEDURE — 84484 ASSAY OF TROPONIN QUANT: CPT | Performed by: INTERNAL MEDICINE

## 2017-01-14 PROCEDURE — C9113 INJ PANTOPRAZOLE SODIUM, VIA: HCPCS | Performed by: INTERNAL MEDICINE

## 2017-01-14 PROCEDURE — 83036 HEMOGLOBIN GLYCOSYLATED A1C: CPT | Performed by: INTERNAL MEDICINE

## 2017-01-14 PROCEDURE — 80053 COMPREHEN METABOLIC PANEL: CPT | Performed by: INTERNAL MEDICINE

## 2017-01-14 PROCEDURE — 80061 LIPID PANEL: CPT | Performed by: INTERNAL MEDICINE

## 2017-01-14 PROCEDURE — 65270000029 HC RM PRIVATE

## 2017-01-14 PROCEDURE — 85025 COMPLETE CBC W/AUTO DIFF WBC: CPT | Performed by: INTERNAL MEDICINE

## 2017-01-14 PROCEDURE — 74011000250 HC RX REV CODE- 250: Performed by: INTERNAL MEDICINE

## 2017-01-14 RX ORDER — MAG HYDROX/ALUMINUM HYD/SIMETH 200-200-20
30 SUSPENSION, ORAL (FINAL DOSE FORM) ORAL
Status: DISCONTINUED | OUTPATIENT
Start: 2017-01-14 | End: 2017-01-15

## 2017-01-14 RX ADMIN — HEPARIN SODIUM 5000 UNITS: 5000 INJECTION, SOLUTION INTRAVENOUS; SUBCUTANEOUS at 14:08

## 2017-01-14 RX ADMIN — ONDANSETRON 4 MG: 2 INJECTION INTRAMUSCULAR; INTRAVENOUS at 06:20

## 2017-01-14 RX ADMIN — SODIUM CHLORIDE 100 ML/HR: 900 INJECTION, SOLUTION INTRAVENOUS at 19:00

## 2017-01-14 RX ADMIN — Medication 10 ML: at 06:20

## 2017-01-14 RX ADMIN — GABAPENTIN 100 MG: 100 CAPSULE ORAL at 08:29

## 2017-01-14 RX ADMIN — HEPARIN SODIUM 5000 UNITS: 5000 INJECTION, SOLUTION INTRAVENOUS; SUBCUTANEOUS at 20:49

## 2017-01-14 RX ADMIN — SODIUM CHLORIDE 100 ML/HR: 900 INJECTION, SOLUTION INTRAVENOUS at 09:23

## 2017-01-14 RX ADMIN — CEFTRIAXONE 1 G: 1 INJECTION, POWDER, FOR SOLUTION INTRAMUSCULAR; INTRAVENOUS at 23:48

## 2017-01-14 RX ADMIN — HEPARIN SODIUM 5000 UNITS: 5000 INJECTION, SOLUTION INTRAVENOUS; SUBCUTANEOUS at 04:24

## 2017-01-14 RX ADMIN — LIDOCAINE HYDROCHLORIDE 40 ML: 20 SOLUTION ORAL; TOPICAL at 20:53

## 2017-01-14 RX ADMIN — GABAPENTIN 100 MG: 100 CAPSULE ORAL at 20:54

## 2017-01-14 RX ADMIN — ONDANSETRON 4 MG: 2 INJECTION INTRAMUSCULAR; INTRAVENOUS at 20:59

## 2017-01-14 RX ADMIN — ALUMINUM HYDROXIDE, MAGNESIUM HYDROXIDE, AND SIMETHICONE 30 ML: 200; 200; 20 SUSPENSION ORAL at 18:11

## 2017-01-14 NOTE — ED NOTES
Bedside shift change report given to Gil Arriaga RN  (oncoming nurse) by Nellene Bamberger, RN  (offgoing nurse). Report included the following information SBAR, Kardex and ED Summary.      Pt informed about her admission status and Rocephin and maintenance fluids need to be given, 500 ml of first liter remains to be infused

## 2017-01-14 NOTE — ED NOTES
Assumed care of pt at this time, received bedside report from JOEY Guallpa with pt included in care. Pt is resting in room, with call bell in reach. All questions answered.

## 2017-01-14 NOTE — ROUTINE PROCESS
TRANSFER - OUT REPORT:    Verbal report given to [facility] on Oscar Shin  being transferred to [address] [place] [address] for routine progression of care       Report consisted of patients Situation, Background, Assessment and   Recommendations(SBAR). Information from the following report(s) ED Summary was reviewed with the receiving nurse. Lines:   Peripheral IV 01/13/17 Right Antecubital (Active)   Site Assessment Clean, dry, & intact 1/13/2017  5:37 PM   Phlebitis Assessment 0 1/13/2017  5:37 PM   Infiltration Assessment 0 1/13/2017  5:37 PM   Dressing Status Clean, dry, & intact 1/13/2017  5:37 PM   Dressing Type Transparent 1/13/2017  5:37 PM   Hub Color/Line Status Flushed 1/13/2017  5:37 PM        Opportunity for questions and clarification was provided.       Patient transported with:   [employer]

## 2017-01-14 NOTE — PROGRESS NOTES
[facility]      NAME: Caren Shanks   :  1955  MRM:  [id number]    Date/Time: 2017  12:44 PM      Ramón Morocho MD  Hospitalist Progress Note        Interim Hospital Summary: 62 y.o. female whom presented on 2017 with        Assessment / Plan:    Intractable nausea and Vomiting  Likely secondary to UTI  Acute renal failure likely pre renal from volume depletion  Cr 2 on admission, now trending down with fluids, 1.4 this morning  -await urine cx  -cont empiric abx  -cont IV fluids  -avoid nephrotoxic drugs  -dose meds as per renal function    HTN: POA  BP reasonably controlled off meds, watch closely     chronic pain: POA  Resume home meds      Code status: FULL   VTE PROPHYLAXIS:heparin                   Subjective:     Chief Complaint:  Still c/o nausea,, last vomiting around 3 am. abd pain improving      ROS:  Constitutional: negative for fevers, chills, sweats, fatigue, malaise, anorexia and weight loss   Eyes: negative for irritation, redness and icterus   Ears, nose, mouth, throat, and face: negative for ear drainage, earaches, nasal congestion, sore mouth and sore throat   Respiratory: negative for cough, sputum, hemoptysis, pleurisy/chest pain, asthma, wheezing or dyspnea on exertion   Cardiovascular: negative for dyspnea, palpitations, irregular heart beats, near-syncope, syncope, orthopnea, paroxysmal nocturnal dyspnea, lower extremity edema, tachypnea   Gastrointestinal: as per hpi  Genitourinary:negative for frequency and dysuria   Hematologic/lymphatic: negative for easy bruising, bleeding, lymphadenopathy, petechiae and coughing up blood   Musculoskeletal:negative for myalgias, arthralgias and muscle weakness   Neurological: negative for headaches and dizziness   Endocrine: Denies heat or cold intolerance       Objective:       Vitals:          Last 24hrs VS reviewed since prior progress note.  Most recent are:    Visit Vitals  /57    Pulse (!) 55    Temp 97.9 °F (36.6 °C)    Resp 14    Ht 5' 7\" (1.702 m)    Wt 82.5 kg (181 lb 14.1 oz)    SpO2 100%    BMI 28.49 kg/m2     SpO2 Readings from Last 6 Encounters:   01/14/17 100%   02/18/16 100%   04/15/13 97%          Intake/Output Summary (Last 24 hours) at 01/14/17 1244  Last data filed at 01/14/17 0548   Gross per 24 hour   Intake                0 ml   Output              600 ml   Net             -600 ml          Exam:     Physical Exam:    Gen:  Well-developed, well-nourished, in no acute distress  HEENT:  Pink conjunctivae, PERRL, hearing intact to voice, moist mucous membranes  Neck:  Supple, without masses, thyroid non-tender  Resp:  No accessory muscle use, clear breath sounds without wheezes rales or rhonchi  Card:  No murmurs, normal S1, S2 without thrills, bruits or peripheral edema  Abd:  Soft, non-tender, non-distended, normoactive bowel sounds are present  Musc:  No cyanosis or clubbing  Skin:  No rashes or ulcers, skin turgor is good  Neuro:  Cranial nerves 3-12 are grossly intact  Psych:  Good insight, oriented to person, place and time, alert    Medications Reviewed: (see below)    Lab Data Reviewed: (see below)    ______________________________________________________________________    Medications:     Current Facility-Administered Medications   Medication Dose Route Frequency    sodium chloride (NS) flush 5-10 mL  5-10 mL IntraVENous PRN    0.9% sodium chloride infusion  100 mL/hr IntraVENous CONTINUOUS    ondansetron (ZOFRAN) injection 4 mg  4 mg IntraVENous Q6H PRN    heparin (porcine) injection 5,000 Units  5,000 Units SubCUTAneous Q8H    mylanta/viscous lidocaine (COY)(GI COCKTAIL)  40 mL Oral Q6H PRN    pantoprazole (PROTONIX) 40 mg in sodium chloride 0.9 % 10 mL injection  40 mg IntraVENous DAILY    gabapentin (NEURONTIN) capsule 100 mg  100 mg Oral TID    hydrALAZINE (APRESOLINE) 20 mg/mL injection 10 mg  10 mg IntraVENous Q6H PRN    cefTRIAXone (ROCEPHIN) 1 g in 0.9% sodium chloride (MBP/ADV) 50 mL  1 g IntraVENous Q24H            Lab Review:     Recent Labs      01/14/17   0413  01/13/17   1527   WBC  8.5  8.9   HGB  12.0  13.7   HCT  35.6  40.7   PLT  169  192     Recent Labs      01/14/17   0413  01/13/17   1527   NA  140  139   K  4.3  3.7   CL  108  103   CO2  22  24   GLU  105*  90   BUN  17  25*   CREA  1.40*  2.18*   CA  8.1*  8.6   ALB  3.5  4.0   SGOT  13*  17   ALT  20  22     No components found for: GLPOC        Total time spent with patient: 30 [address] discussed with: Patient    Discussed:  Care Plan    Prophylaxis:  Lovenox    Disposition:  Home w/Family           ___________________________________________________    Attending Physician: Toy Jones MD

## 2017-01-14 NOTE — H&P
Hospitalist Admission Note    NAME: Pam Devine   :  1955   MRN:  [id number]     Date/Time:  2017 8:32 PM    Patient PCP: Alejandro Christianson MD  ________________________________________________________________________    My assessment of this patient's clinical condition and my plan of care is as follows. Assessment / Plan:  #DOMITILA  patient with elevated creatinine of 2.0 from baseline normal per patient  suspect possible pre-renal in the setting of bactrim use-  -Continue to monitor with BMP  ivf   -However, if  Function not  improving with IVF  , will draw urine lytes if does not improve and consult renal/get us in am      # UTI follow cx  Cont rocephin for now    # N/Vlikely 2 to above  Protonix/ gi cockatail  Zofran prn  Troponin negt/trend x2   ekg pnd    # HTN hold arb/ace for now   ivf   will use hydralazine PRN    # chronic pain- contw home meds-    #FEN:  -Low salt clears for now  -Mag >2, K >4  #prophy:  - Heparin TID  Cont with Protonix qd  #code:  -FULL CODE, confirmed with patient  VTE PROPHYLAXIS:  Pharmacologic indicated. Medical Patients: Heparin 5000 units SQ  q8hrs            Subjective:   CHIEF COMPLAINT: dark urine/n/v    HISTORY OF PRESENT ILLNESS:     Yogi Dobbs is a 62 y.o. Candance Huger female with a hx of acid reflux, HTN, and fibromyalgia presenting to the ED C/O epigastric abd pain which started 4 days ago. that started on Monday  Associated symptoms include chills, nausea, and vomiting. [facility]  where she was noted to have an uti started on bactrim ,pt  Went to work but was not feeling well- went home had a few episodes of nausea w/out vomiting, no blood- in the vomitus. No back pain. SHx significant for . Patient denies diarrhea, hematochezia, fever, or any other symptoms or complaints. We were asked to admit for work up and evaluation of the above problems.      Past Medical History   Diagnosis Date    Gastrointestinal disorder      acid reflux    Hypertension     Other ill-defined conditions(799.89)      fibromyalgia        Past Surgical History   Procedure Laterality Date    Hx gyn       , tubal ligation    Pr gerd tst w/ mucos impede electrod,>1hr  2016          Pr esophageal motility study w/interp&rpt  2016             Social History   Substance Use Topics    Smoking status: Never Smoker    Smokeless tobacco: Not on file    Alcohol use No        History reviewed. No pertinent family history. Allergies   Allergen Reactions    Pcn [Penicillins] Hives        Prior to Admission medications    Medication Sig Start Date End Date Taking? Authorizing Provider   gabapentin (NEURONTIN) 100 mg capsule Take 100 mg by mouth three (3) times daily. Yes Historical Provider   lansoprazole (PREVACID) 30 mg capsule Take 20 mg by mouth Daily (before breakfast). Yes Phys Other, MD   metoprolol (LOPRESSOR) 100 mg tablet Take 100 mg by mouth daily. Yes Yanique Other, MD   acetaminophen (TYLENOL) 500 mg tablet Take 500 mg by mouth every six (6) hours as needed for Pain. Historical Provider       REVIEW OF SYSTEMS:     I am not able to complete the review of systems because:    The patient is intubated and sedated    The patient has altered mental status due to his acute medical problems    The patient has baseline aphasia from prior stroke(s)    The patient has baseline dementia and is not reliable historian    The patient is in acute medical distress and unable to provide information           Total of 12 systems reviewed as follows:       POSITIVE= underlined text  Negative = text not underlined  General:  fever, chills, sweats, generalized weakness, weight loss/gain,      loss of appetite   Eyes:    blurred vision, eye pain, loss of vision, double vision  ENT:    rhinorrhea, pharyngitis   Respiratory:   cough, sputum production, SOB, COLINDRES, wheezing, pleuritic pain   Cardiology:   chest pain, palpitations, orthopnea, PND, edema, syncope   Gastrointestinal:  abdominal pain , N/V, diarrhea, dysphagia, constipation, bleeding   Genitourinary:  frequency, urgency, dysuria, hematuria, incontinence   Muskuloskeletal :  arthralgia, myalgia, back pain  Hematology:  easy bruising, nose or gum bleeding, lymphadenopathy   Dermatological: rash, ulceration, pruritis, color change / jaundice  Endocrine:   hot flashes or polydipsia   Neurological:  headache, dizziness, confusion, focal weakness, paresthesia,     Speech difficulties, memory loss, gait difficulty  Psychological: Feelings of anxiety, depression, agitation    Objective:   VITALS:    Visit Vitals    /86 (BP 1 Location: Right arm, BP Patient Position: At rest)    Pulse (!) 53    Temp 97.9 °F (36.6 °C)    Resp 16    Ht 5' 7\" (1.702 m)    Wt 82.5 kg (181 lb 14.1 oz)    SpO2 100%    BMI 28.49 kg/m2       PHYSICAL EXAM:    General:    Alert, cooperative, no distress, appears stated age. HEENT: Atraumatic, anicteric sclerae, pink conjunctivae     No oral ulcers, mucosa moist, throat clear, dentition fair  Neck:  Supple, symmetrical,  thyroid: non tender  Lungs:   Clear to auscultation bilaterally. No Wheezing or Rhonchi. No rales. Chest wall:  No tenderness  No Accessory muscle use. Heart:   Regular  rhythm,  No  murmur   No edema  Abdomen:   Soft,-mild tender mid epigas no rt no guarding. Not distended. Bowel sounds normal  Extremities: No cyanosis. No clubbing      Capillary refill normal,  Radial pulse 2+,  DP   Skin:     Not pale. Not Jaundiced  No rashes   Psych:  Good insight. Not depressed. Not anxious or agitated. Neurologic: EOMs intact. No facial asymmetry. No aphasia or slurred speech. Symmetrical strength, Sensation grossly intact.  Alert and oriented X 4.     _______________________________________________________________________  Care Plan discussed with:    Comments   Patient x    Family      RN     Care Manager Consultant:      _______________________________________________________________________  Expected  Disposition:   Home with Family x   HH/PT/OT/RN    SNF/LTC    CM    ________________________________________________________________________  TOTAL TIME:  61  Minutes    Critical Care Provided     Minutes non procedure based      Comments    xx Reviewed previous records   >50% of visit spent in counseling and coordination of care xx Discussion with patient and/or family and questions answered       ________________________________________________________________________  Signed: Agusto Marrufo MD    Procedures: see electronic medical records for all procedures/Xrays and details which were not copied into this note but were reviewed prior to creation of Plan. LAB DATA REVIEWED:    Recent Results (from the past 24 hour(s))   CBC WITH AUTOMATED DIFF    Collection Time: 01/13/17  3:27 PM   Result Value Ref Range    WBC 8.9 3.6 - 11.0 K/uL    RBC 4.65 3.80 - 5.20 M/uL    HGB 13.7 11.5 - 16.0 g/dL    HCT 40.7 35.0 - 47.0 %    MCV 87.5 80.0 - 99.0 FL    MCH 29.5 26.0 - 34.0 PG    MCHC 33.7 30.0 - 36.5 g/dL    RDW 13.1 11.5 - 14.5 %    PLATELET 154 932 - 021 K/uL    NEUTROPHILS 59 32 - 75 %    LYMPHOCYTES 31 12 - 49 %    MONOCYTES 8 5 - 13 %    EOSINOPHILS 1 0 - 7 %    BASOPHILS 1 0 - 1 %    ABS. NEUTROPHILS 5.3 1.8 - 8.0 K/UL    ABS. LYMPHOCYTES 2.7 0.8 - 3.5 K/UL    ABS. MONOCYTES 0.7 0.0 - 1.0 K/UL    ABS. EOSINOPHILS 0.1 0.0 - 0.4 K/UL    ABS.  BASOPHILS 0.1 0.0 - 0.1 K/UL   METABOLIC PANEL, COMPREHENSIVE    Collection Time: 01/13/17  3:27 PM   Result Value Ref Range    Sodium 139 136 - 145 mmol/L    Potassium 3.7 3.5 - 5.1 mmol/L    Chloride 103 97 - 108 mmol/L    CO2 24 21 - 32 mmol/L    Anion gap 12 5 - 15 mmol/L    Glucose 90 65 - 100 mg/dL    BUN 25 (H) 6 - 20 MG/DL    Creatinine 2.18 (H) 0.55 - 1.02 MG/DL    BUN/Creatinine ratio 11 (L) 12 - 20      GFR est AA 28 (L) >60 ml/min/1.73m2    GFR est non-AA 23 (L) >60 ml/min/1.73m2    Calcium 8.6 8.5 - 10.1 MG/DL    Bilirubin, total 0.9 0.2 - 1.0 MG/DL    ALT 22 12 - 78 U/L    AST 17 15 - 37 U/L    Alk.  phosphatase 130 (H) 45 - 117 U/L    Protein, total 8.2 6.4 - 8.2 g/dL    Albumin 4.0 3.5 - 5.0 g/dL    Globulin 4.2 (H) 2.0 - 4.0 g/dL    A-G Ratio 1.0 (L) 1.1 - 2.2     LIPASE    Collection Time: 01/13/17  3:27 PM   Result Value Ref Range    Lipase 122 73 - 393 U/L   TROPONIN I    Collection Time: 01/13/17  3:27 PM   Result Value Ref Range    Troponin-I, Qt. <0.04 <0.05 ng/mL   URINALYSIS W/ REFLEX CULTURE    Collection Time: 01/13/17  4:55 PM   Result Value Ref Range    Color YELLOW/STRAW      Appearance TURBID (A) CLEAR      Specific gravity 1.026 1.003 - 1.030      pH (UA) 6.0 5.0 - 8.0      Protein TRACE (A) NEG mg/dL    Glucose NEGATIVE  NEG mg/dL    Ketone TRACE (A) NEG mg/dL    Bilirubin NEGATIVE  NEG      Blood TRACE (A) NEG      Urobilinogen 1.0 0.2 - 1.0 EU/dL    Nitrites NEGATIVE  NEG      Leukocyte Esterase MODERATE (A) NEG      WBC 10-20 0 - 4 /hpf    RBC 0-5 0 - 5 /hpf    Epithelial cells MODERATE (A) FEW /lpf    Bacteria 2+ (A) NEG /hpf    UA:UC IF INDICATED URINE CULTURE ORDERED (A) CNI

## 2017-01-15 ENCOUNTER — APPOINTMENT (OUTPATIENT)
Dept: GENERAL RADIOLOGY | Age: 62
DRG: 418 | End: 2017-01-15
Attending: HOSPITALIST
Payer: COMMERCIAL

## 2017-01-15 ENCOUNTER — APPOINTMENT (OUTPATIENT)
Dept: CT IMAGING | Age: 62
DRG: 418 | End: 2017-01-15
Attending: INTERNAL MEDICINE
Payer: COMMERCIAL

## 2017-01-15 LAB
ALBUMIN SERPL BCP-MCNC: 3.9 G/DL (ref 3.5–5)
ALBUMIN/GLOB SERPL: 1 {RATIO} (ref 1.1–2.2)
ALP SERPL-CCNC: 136 U/L (ref 45–117)
ALT SERPL-CCNC: 27 U/L (ref 12–78)
ANION GAP BLD CALC-SCNC: 10 MMOL/L (ref 5–15)
AST SERPL W P-5'-P-CCNC: 22 U/L (ref 15–37)
BACTERIA SPEC CULT: NORMAL
BASOPHILS # BLD AUTO: 0.1 K/UL (ref 0–0.1)
BASOPHILS # BLD: 1 % (ref 0–1)
BILIRUB DIRECT SERPL-MCNC: 0.2 MG/DL (ref 0–0.2)
BILIRUB SERPL-MCNC: 0.9 MG/DL (ref 0.2–1)
BUN SERPL-MCNC: 10 MG/DL (ref 6–20)
BUN/CREAT SERPL: 9 (ref 12–20)
CALCIUM SERPL-MCNC: 8.2 MG/DL (ref 8.5–10.1)
CC UR VC: NORMAL
CHLORIDE SERPL-SCNC: 108 MMOL/L (ref 97–108)
CO2 SERPL-SCNC: 25 MMOL/L (ref 21–32)
CREAT SERPL-MCNC: 1.09 MG/DL (ref 0.55–1.02)
EOSINOPHIL # BLD: 0.1 K/UL (ref 0–0.4)
EOSINOPHIL NFR BLD: 1 % (ref 0–7)
ERYTHROCYTE [DISTWIDTH] IN BLOOD BY AUTOMATED COUNT: 12.9 % (ref 11.5–14.5)
GLOBULIN SER CALC-MCNC: 4 G/DL (ref 2–4)
GLUCOSE SERPL-MCNC: 88 MG/DL (ref 65–100)
HCT VFR BLD AUTO: 34.5 % (ref 35–47)
HGB BLD-MCNC: 11.9 G/DL (ref 11.5–16)
LYMPHOCYTES # BLD AUTO: 38 % (ref 12–49)
LYMPHOCYTES # BLD: 2.8 K/UL (ref 0.8–3.5)
MCH RBC QN AUTO: 30.4 PG (ref 26–34)
MCHC RBC AUTO-ENTMCNC: 34.5 G/DL (ref 30–36.5)
MCV RBC AUTO: 88.2 FL (ref 80–99)
MONOCYTES # BLD: 0.4 K/UL (ref 0–1)
MONOCYTES NFR BLD AUTO: 6 % (ref 5–13)
NEUTS SEG # BLD: 3.9 K/UL (ref 1.8–8)
NEUTS SEG NFR BLD AUTO: 54 % (ref 32–75)
PLATELET # BLD AUTO: 165 K/UL (ref 150–400)
POTASSIUM SERPL-SCNC: 3.9 MMOL/L (ref 3.5–5.1)
PROT SERPL-MCNC: 7.9 G/DL (ref 6.4–8.2)
RBC # BLD AUTO: 3.91 M/UL (ref 3.8–5.2)
SERVICE CMNT-IMP: NORMAL
SODIUM SERPL-SCNC: 143 MMOL/L (ref 136–145)
TROPONIN I SERPL-MCNC: <0.04 NG/ML
TROPONIN I SERPL-MCNC: <0.04 NG/ML
WBC # BLD AUTO: 7.2 K/UL (ref 3.6–11)

## 2017-01-15 PROCEDURE — C9113 INJ PANTOPRAZOLE SODIUM, VIA: HCPCS | Performed by: INTERNAL MEDICINE

## 2017-01-15 PROCEDURE — 74011000250 HC RX REV CODE- 250: Performed by: INTERNAL MEDICINE

## 2017-01-15 PROCEDURE — 74177 CT ABD & PELVIS W/CONTRAST: CPT

## 2017-01-15 PROCEDURE — 80076 HEPATIC FUNCTION PANEL: CPT | Performed by: INTERNAL MEDICINE

## 2017-01-15 PROCEDURE — 74022 RADEX COMPL AQT ABD SERIES: CPT

## 2017-01-15 PROCEDURE — 65270000029 HC RM PRIVATE

## 2017-01-15 PROCEDURE — 84484 ASSAY OF TROPONIN QUANT: CPT | Performed by: INTERNAL MEDICINE

## 2017-01-15 PROCEDURE — 74011250637 HC RX REV CODE- 250/637: Performed by: INTERNAL MEDICINE

## 2017-01-15 PROCEDURE — 74011250636 HC RX REV CODE- 250/636: Performed by: HOSPITALIST

## 2017-01-15 PROCEDURE — 80048 BASIC METABOLIC PNL TOTAL CA: CPT | Performed by: INTERNAL MEDICINE

## 2017-01-15 PROCEDURE — 74011250637 HC RX REV CODE- 250/637: Performed by: HOSPITALIST

## 2017-01-15 PROCEDURE — 85025 COMPLETE CBC W/AUTO DIFF WBC: CPT | Performed by: INTERNAL MEDICINE

## 2017-01-15 PROCEDURE — 77030018846 HC SOL IRR STRL H20 ICUM -A

## 2017-01-15 PROCEDURE — 74011250636 HC RX REV CODE- 250/636: Performed by: INTERNAL MEDICINE

## 2017-01-15 PROCEDURE — 36415 COLL VENOUS BLD VENIPUNCTURE: CPT | Performed by: INTERNAL MEDICINE

## 2017-01-15 RX ORDER — SODIUM CHLORIDE 9 MG/ML
50 INJECTION, SOLUTION INTRAVENOUS
Status: DISPENSED | OUTPATIENT
Start: 2017-01-15 | End: 2017-01-16

## 2017-01-15 RX ORDER — SUCRALFATE 1 G/10ML
1 SUSPENSION ORAL 4 TIMES DAILY
Status: DISCONTINUED | OUTPATIENT
Start: 2017-01-15 | End: 2017-01-20 | Stop reason: HOSPADM

## 2017-01-15 RX ORDER — SODIUM CHLORIDE 0.9 % (FLUSH) 0.9 %
10 SYRINGE (ML) INJECTION
Status: ACTIVE | OUTPATIENT
Start: 2017-01-15 | End: 2017-01-16

## 2017-01-15 RX ORDER — MORPHINE SULFATE 2 MG/ML
1 INJECTION, SOLUTION INTRAMUSCULAR; INTRAVENOUS ONCE
Status: COMPLETED | OUTPATIENT
Start: 2017-01-15 | End: 2017-01-15

## 2017-01-15 RX ORDER — FAMOTIDINE 40 MG/5ML
20 POWDER, FOR SUSPENSION ORAL 2 TIMES DAILY
Status: DISCONTINUED | OUTPATIENT
Start: 2017-01-15 | End: 2017-01-15

## 2017-01-15 RX ORDER — MORPHINE SULFATE 4 MG/ML
1 INJECTION, SOLUTION INTRAMUSCULAR; INTRAVENOUS
Status: DISCONTINUED | OUTPATIENT
Start: 2017-01-15 | End: 2017-01-17

## 2017-01-15 RX ORDER — FAMOTIDINE 20 MG/1
20 TABLET, FILM COATED ORAL 2 TIMES DAILY
Status: DISCONTINUED | OUTPATIENT
Start: 2017-01-15 | End: 2017-01-16

## 2017-01-15 RX ORDER — SIMETHICONE 80 MG
80 TABLET,CHEWABLE ORAL
Status: DISCONTINUED | OUTPATIENT
Start: 2017-01-15 | End: 2017-01-20 | Stop reason: HOSPADM

## 2017-01-15 RX ORDER — OXYCODONE AND ACETAMINOPHEN 5; 325 MG/1; MG/1
1 TABLET ORAL
Status: DISCONTINUED | OUTPATIENT
Start: 2017-01-15 | End: 2017-01-19

## 2017-01-15 RX ADMIN — ONDANSETRON 4 MG: 2 INJECTION INTRAMUSCULAR; INTRAVENOUS at 12:04

## 2017-01-15 RX ADMIN — SODIUM CHLORIDE 40 MG: 9 INJECTION INTRAMUSCULAR; INTRAVENOUS; SUBCUTANEOUS at 00:05

## 2017-01-15 RX ADMIN — SODIUM CHLORIDE 40 MG: 9 INJECTION INTRAMUSCULAR; INTRAVENOUS; SUBCUTANEOUS at 12:26

## 2017-01-15 RX ADMIN — FAMOTIDINE 20 MG: 20 TABLET ORAL at 19:12

## 2017-01-15 RX ADMIN — SUCRALFATE 1 G: 1 SUSPENSION ORAL at 12:26

## 2017-01-15 RX ADMIN — ONDANSETRON 4 MG: 2 INJECTION INTRAMUSCULAR; INTRAVENOUS at 18:20

## 2017-01-15 RX ADMIN — Medication 1 MG: at 01:28

## 2017-01-15 RX ADMIN — SUCRALFATE 1 G: 1 SUSPENSION ORAL at 19:11

## 2017-01-15 RX ADMIN — ALUMINUM HYDROXIDE, MAGNESIUM HYDROXIDE, AND SIMETHICONE 30 ML: 200; 200; 20 SUSPENSION ORAL at 06:15

## 2017-01-15 RX ADMIN — MORPHINE SULFATE 1 MG: 4 INJECTION, SOLUTION INTRAMUSCULAR; INTRAVENOUS at 21:11

## 2017-01-15 RX ADMIN — SODIUM CHLORIDE 100 ML/HR: 900 INJECTION, SOLUTION INTRAVENOUS at 21:09

## 2017-01-15 RX ADMIN — LIDOCAINE HYDROCHLORIDE 40 ML: 20 SOLUTION ORAL; TOPICAL at 03:33

## 2017-01-15 RX ADMIN — SODIUM CHLORIDE 100 ML/HR: 900 INJECTION, SOLUTION INTRAVENOUS at 08:00

## 2017-01-15 RX ADMIN — Medication 10 ML: at 06:15

## 2017-01-15 RX ADMIN — HEPARIN SODIUM 5000 UNITS: 5000 INJECTION, SOLUTION INTRAVENOUS; SUBCUTANEOUS at 21:10

## 2017-01-15 RX ADMIN — SODIUM CHLORIDE 40 MG: 9 INJECTION INTRAMUSCULAR; INTRAVENOUS; SUBCUTANEOUS at 21:11

## 2017-01-15 RX ADMIN — Medication 1 MG: at 06:38

## 2017-01-15 RX ADMIN — OXYCODONE HYDROCHLORIDE AND ACETAMINOPHEN 1 TABLET: 5; 325 TABLET ORAL at 19:43

## 2017-01-15 RX ADMIN — ONDANSETRON 4 MG: 2 INJECTION INTRAMUSCULAR; INTRAVENOUS at 06:15

## 2017-01-15 RX ADMIN — SUCRALFATE 1 G: 1 SUSPENSION ORAL at 21:11

## 2017-01-15 RX ADMIN — GABAPENTIN 100 MG: 100 CAPSULE ORAL at 09:14

## 2017-01-15 RX ADMIN — MORPHINE SULFATE 1 MG: 4 INJECTION, SOLUTION INTRAMUSCULAR; INTRAVENOUS at 16:28

## 2017-01-15 RX ADMIN — FAMOTIDINE 20 MG: 20 TABLET ORAL at 10:07

## 2017-01-15 RX ADMIN — Medication 10 ML: at 01:28

## 2017-01-15 RX ADMIN — HEPARIN SODIUM 5000 UNITS: 5000 INJECTION, SOLUTION INTRAVENOUS; SUBCUTANEOUS at 06:15

## 2017-01-15 RX ADMIN — Medication 10 ML: at 16:28

## 2017-01-15 RX ADMIN — HEPARIN SODIUM 5000 UNITS: 5000 INJECTION, SOLUTION INTRAVENOUS; SUBCUTANEOUS at 11:55

## 2017-01-15 RX ADMIN — MORPHINE SULFATE 1 MG: 4 INJECTION, SOLUTION INTRAMUSCULAR; INTRAVENOUS at 09:14

## 2017-01-15 RX ADMIN — OXYCODONE HYDROCHLORIDE AND ACETAMINOPHEN 1 TABLET: 5; 325 TABLET ORAL at 12:04

## 2017-01-15 RX ADMIN — HYDRALAZINE HYDROCHLORIDE 10 MG: 20 INJECTION INTRAMUSCULAR; INTRAVENOUS at 15:46

## 2017-01-15 RX ADMIN — GABAPENTIN 100 MG: 100 CAPSULE ORAL at 21:11

## 2017-01-15 NOTE — CONSULTS
Christian Ricketts M.D.  [phone]          GASTROENTEROLOGY CONSULTATION NOTE      NAME:  Orlando Cabral   :   1955   MRN:   [id number]       Referring Physician: Prem    Consult Date: 1/15/2017     Chief Complaint: epigastric pain    History of Present Illness:  Patient is a 62 y.o. with a history of GERD admitted for UTI being seen for worsening epigastric pain. She reports that the pain is 10/10 and acutely worsened over the last few hours. No bleeding symptoms. Prior pH probe and motility study with Dr. Esperanza Almaraz last year show GERD (elevated demeester score) and weak peristalsis. Prior EGD result not available to review. Normal hemoglobin and LFTs since admission. CT on  with no acute process. The AXR today with no free air or bowel obstruction. PMH:  Past Medical History   Diagnosis Date    Gastrointestinal disorder      acid reflux    Hypertension     Other ill-defined conditions(799.89)      fibromyalgia       PSH:  Past Surgical History   Procedure Laterality Date    Hx gyn       , tubal ligation    Pr gerd tst w/ mucos impede electrod,>1hr  2016          Pr esophageal motility study w/interp&rpt  2016             Allergies: Allergies   Allergen Reactions    Pcn [Penicillins] Hives       Home Medications:  Prior to Admission Medications   Prescriptions Last Dose Informant Patient Reported? Taking?   acetaminophen (TYLENOL) 500 mg tablet Unknown at Unknown time  Yes No   Sig: Take 500 mg by mouth every six (6) hours as needed for Pain.   gabapentin (NEURONTIN) 100 mg capsule 2017 at Unknown time  Yes Yes   Sig: Take 100 mg by mouth three (3) times daily. lansoprazole (PREVACID) 30 mg capsule 2017 at Unknown time  Yes Yes   Sig: Take 20 mg by mouth Daily (before breakfast). metoprolol (LOPRESSOR) 100 mg tablet 2017 at Unknown time  Yes Yes   Sig: Take 100 mg by mouth daily. Facility-Administered Medications: None       Hospital Medications:  Current Facility-Administered Medications   Medication Dose Route Frequency    oxyCODONE-acetaminophen (PERCOCET) 5-325 mg per tablet 1 Tab  1 Tab Oral Q4H PRN    morphine injection 1 mg  1 mg IntraVENous Q6H PRN    simethicone (MYLICON) tablet 80 mg  80 mg Oral QID PRN    famotidine (PEPCID) tablet 20 mg  20 mg Oral BID    sodium chloride (NS) flush 5-10 mL  5-10 mL IntraVENous PRN    0.9% sodium chloride infusion  100 mL/hr IntraVENous CONTINUOUS    ondansetron (ZOFRAN) injection 4 mg  4 mg IntraVENous Q6H PRN    heparin (porcine) injection 5,000 Units  5,000 Units SubCUTAneous Q8H    gabapentin (NEURONTIN) capsule 100 mg  100 mg Oral TID    hydrALAZINE (APRESOLINE) 20 mg/mL injection 10 mg  10 mg IntraVENous Q6H PRN    cefTRIAXone (ROCEPHIN) 1 g in 0.9% sodium chloride (MBP/ADV) 50 mL  1 g IntraVENous Q24H       Family History:  History reviewed. No pertinent family history. Social History:  Social History   Substance Use Topics    Smoking status: Never Smoker    Smokeless tobacco: Not on file    Alcohol use No       Review of Systems: The rest of the review of systems is negative except per HPI      Objective:   Patient Vitals for the past 8 hrs:   BP Temp Pulse Resp SpO2   01/15/17 0806 160/74 98 °F (36.7 °C) (!) 55 16 98 %     01/15 0701 - 01/15 1900  In: 100 [P.O.:100]  Out: -   01/13 1901 - 01/15 0700  In: 1731.7 [I.V.:1731.7]  Out: 600       PHYSICAL EXAM:  General: Alert, in no acute distress    HEENT: Anicteric conjunctiva. Lungs:            CTA Bilaterally  Heart:  Normal S1, S2    Abdomen: Soft, Non distended,  Tender in the epigastric region. Normoactive bowel sounds, no HSM,   no rebound/guarding  MSK:   Normal muscle tone  Skin:   Warm to touch  Extremities: Negative bilateral pedal edema   Psych:   Good insight. Not anxious nor agitated.     Lab Data Reviewed:   Recent Labs      01/15/17   0342  01/14/17   3784 WBC  7.2  8.5   HGB  11.9  12.0   HCT  34.5*  35.6   PLT  165  169     Recent Labs      01/15/17   0342  01/14/17   0413   NA  143  140   K  3.9  4.3   CL  108  108   CO2  25  22   BUN  10  17   CREA  1.09*  1.40*   GLU  88  105*   CA  8.2*  8.1*     Recent Labs      01/14/17   0413  01/13/17   1527   SGOT  13*  17   AP  116  130*   TP  7.1  8.2   ALB  3.5  4.0   GLOB  3.6  4.2*   LPSE   --   122     No results for input(s): INR, PTP, APTT in the last 72 hours. No lab exists for component: INREXT   No results for input(s): FE, TIBC, PSAT, FERR in the last 72 hours. No results for input(s): CPK, CKMB in the last 72 hours. No lab exists for component: TOPONINI    See Electronic Medical Record for all procedure/radiology reports and details which were not copied into this note but were reviewed prior to the creation of the Plan. Assessment:   · Severe upper abdominal pain that is NEW and worse in the last 12 hours  · History of GERD with pH probe and motility testing confirming this. Prior EGD  · Admitted for UTI on ceftriaxone  · CT on admission without acute finding     Patient Active Problem List   Diagnosis Code    UTI (urinary tract infection) N39.0       Plan:   · Start protonix 40 mg IV Q 12 hours  · Carafate 1 g PO QID  · CT scan repeat given change in clinical status  · Aggressive hydration after IV contrast load  · Lipase/LFT today  · Consider EGD if fails to improve  · Dr. Allyssa Acharya to see patient in the AM  · Thank you for the consultation. Please call with any questions.      Signed by: Jillian Reyna MD         1/15/2017  11:45 AM

## 2017-01-15 NOTE — PROGRESS NOTES
GI consult has been called. Spoke with Dr. Brayton Landau, who stated she would be coming to the see patient.

## 2017-01-15 NOTE — PROGRESS NOTES
Bedside and Verbal shift change report given to Allison Painting RN (oncoming nurse) by Patrick Magallon RN (offgoing nurse). Report included the following information SBAR, Kardex, Procedure Summary, Intake/Output, MAR, Accordion and Recent Results. Zone Phone for oncoming shift:   [phone]    Shift Summary: 6 PM meds have been given, see MAR. LDAs               Peripheral IV 01/13/17 Right Antecubital (Active)   Site Assessment Clean, dry, & intact 1/15/2017  3:00 PM   Phlebitis Assessment 0 1/15/2017  3:00 PM   Infiltration Assessment 0 1/15/2017  3:00 PM   Dressing Status Clean, dry, & intact 1/15/2017  3:00 PM   Dressing Type Transparent;Tape 1/15/2017  3:00 PM   Hub Color/Line Status Pink; Infusing 1/15/2017  3:00 PM   Action Taken Open ports on tubing capped 1/15/2017  3:00 PM   Alcohol Cap Used Yes 1/15/2017  3:00 PM                        Intake & Output   Date 01/14/17 0700 - 01/15/17 0659 01/15/17 0700 - 01/16/17 0659   Shift 9305-9176 7268-2073 24 Hour Total 8503-1852 6388-4289 24 Hour Total   I  N  T  A  K  E   P.O.    200  200      P. O.    200  200    I.V.  (mL/kg/hr) 1731.7  (1.7)  1731.7  (0.9)         Volume (0.9% sodium chloride infusion) 1731.7  1731.7       Shift Total  (mL/kg) 1731.7  (21)  1731.7  (21) 200  (2.4)  200  (2.4)   O  U  T  P  U  T   Urine  (mL/kg/hr)            Urine Occurrence(s)    7 x  7 x    Emesis/NG output    100  100      Emesis    100  100      Emesis Occurrence(s)    1 x  1 x    Shift Total  (mL/kg)    100  (1.2)  100  (1.2)   NET 1731.7  1731.7 100  100   Weight (kg) 82.5 82.5 82.5 82.5 82.5 82.5      Last Bowel Movement Last Bowel Movement Date: 01/12/17   Glucose Checks [x] N/A  [] AC/HS  [] Q6  Concerns:   Nutrition Active Orders   Diet    DIET CLEAR LIQUID       Consults []PT  []OT  []Speech  [x]Case Management   Cardiac Monitoring [x]N/A []Yes Expires:

## 2017-01-15 NOTE — PROGRESS NOTES
[facility]      NAME: Bart Lowe   :  1955  MRM:  [id number]    Date/Time: 1/15/2017  12:44 PM      Brendan Alvarado MD  Hospitalist Progress Note        Interim Hospital Summary: 62 y.o. female whom presented on 2017 with        Assessment / Plan:    C/o severe epigastric pain: POA  Thinking her reflux is getting worse. protonix not helping.  Requesting GI eval. Follows with Dr. Alka Gamez as outpt, will consult    Intractable nausea and Vomiting: resolved  Likely secondary to UTI  Acute renal failure likely pre renal from volume depletion-resolved  -urine cx-pending  -cont empiric abx  -cont IV fluids  -avoid nephrotoxic drugs  -dose meds as per renal function    HTN: POA  BP reasonably controlled off meds, watch closely     chronic pain: POA  Resume home meds      Code status: FULL   VTE PROPHYLAXIS:heparin                   Subjective:     Chief Complaint:  Still c/o nausea,, last vomiting around 3 am. abd pain improving      ROS:  Constitutional: negative for fevers, chills, sweats, fatigue, malaise, anorexia and weight loss   Eyes: negative for irritation, redness and icterus   Ears, nose, mouth, throat, and face: negative for ear drainage, earaches, nasal congestion, sore mouth and sore throat   Respiratory: negative for cough, sputum, hemoptysis, pleurisy/chest pain, asthma, wheezing or dyspnea on exertion   Cardiovascular: negative for dyspnea, palpitations, irregular heart beats, near-syncope, syncope, orthopnea, paroxysmal nocturnal dyspnea, lower extremity edema, tachypnea   Gastrointestinal: as per hpi  Genitourinary:negative for frequency and dysuria   Hematologic/lymphatic: negative for easy bruising, bleeding, lymphadenopathy, petechiae and coughing up blood   Musculoskeletal:negative for myalgias, arthralgias and muscle weakness   Neurological: negative for headaches and dizziness   Endocrine: Denies heat or cold intolerance Objective:       Vitals:          Last 24hrs VS reviewed since prior progress note.  Most recent are:    Visit Vitals    /74 (BP 1 Location: Left arm, BP Patient Position: At rest;Supine)    Pulse (!) 55    Temp 98 °F (36.7 °C)    Resp 16    Ht 5' 7\" (1.702 m)    Wt 82.5 kg (181 lb 14.1 oz)    SpO2 98%    BMI 28.49 kg/m2     SpO2 Readings from Last 6 Encounters:   01/15/17 98%   02/18/16 100%   04/15/13 97%            Intake/Output Summary (Last 24 hours) at 01/15/17 1125  Last data filed at 01/15/17 0738   Gross per 24 hour   Intake          1831.67 ml   Output                0 ml   Net          1831.67 ml          Exam:     Physical Exam:    Gen:  Well-developed, well-nourished, in no acute distress  HEENT:  Pink conjunctivae, PERRL, hearing intact to voice, moist mucous membranes  Neck:  Supple, without masses, thyroid non-tender  Resp:  No accessory muscle use, clear breath sounds without wheezes rales or rhonchi  Card:  No murmurs, normal S1, S2 without thrills, bruits or peripheral edema  Abd:  Soft, non-tender, non-distended, normoactive bowel sounds are present  Musc:  No cyanosis or clubbing  Skin:  No rashes or ulcers, skin turgor is good  Neuro:  Cranial nerves 3-12 are grossly intact  Psych:  Good insight, oriented to person, place and time, alert    Medications Reviewed: (see below)    Lab Data Reviewed: (see below)    ______________________________________________________________________    Medications:     Current Facility-Administered Medications   Medication Dose Route Frequency    oxyCODONE-acetaminophen (PERCOCET) 5-325 mg per tablet 1 Tab  1 Tab Oral Q4H PRN    morphine injection 1 mg  1 mg IntraVENous Q6H PRN    simethicone (MYLICON) tablet 80 mg  80 mg Oral QID PRN    famotidine (PEPCID) tablet 20 mg  20 mg Oral BID    sodium chloride (NS) flush 5-10 mL  5-10 mL IntraVENous PRN    0.9% sodium chloride infusion  100 mL/hr IntraVENous CONTINUOUS    ondansetron (ZOFRAN) injection 4 mg  4 mg IntraVENous Q6H PRN    heparin (porcine) injection 5,000 Units  5,000 Units SubCUTAneous Q8H    gabapentin (NEURONTIN) capsule 100 mg  100 mg Oral TID    hydrALAZINE (APRESOLINE) 20 mg/mL injection 10 mg  10 mg IntraVENous Q6H PRN    cefTRIAXone (ROCEPHIN) 1 g in 0.9% sodium chloride (MBP/ADV) 50 mL  1 g IntraVENous Q24H            Lab Review:     Recent Labs      01/15/17   0342  01/14/17   0413  01/13/17   1527   WBC  7.2  8.5  8.9   HGB  11.9  12.0  13.7   HCT  34.5*  35.6  40.7   PLT  165  169  192     Recent Labs      01/15/17   0342  01/14/17   0413  01/13/17   1527   NA  143  140  139   K  3.9  4.3  3.7   CL  108  108  103   CO2  25  22  24   GLU  88  105*  90   BUN  10  17  25*   CREA  1.09*  1.40*  2.18*   CA  8.2*  8.1*  8.6   ALB   --   3.5  4.0   SGOT   --   13*  17   ALT   --   20  22     No components found for: GLPOC        Total time spent with patient: [address] discussed with: Patient    Discussed:  Care Plan    Prophylaxis:  Lovenox    Disposition:  Home w/Family           ___________________________________________________    Attending Physician: Jerrica Arredondo MD

## 2017-01-15 NOTE — PROGRESS NOTES
Pt feeling nauseous, dry heaving and only phlegm is coming up. GI cocktail given and nausea med given.

## 2017-01-15 NOTE — PROGRESS NOTES
Pt c/o pain 8-10/10 in epigastric area overnight. Tearful at times. Also c/o nausea with dry heaves. No emesis noted. On call md paged twice for pain management. One time orders for morphine received x2. Pt seems to believe this is coming from her esophagus. She states that she had this same type of pain in her 25s. Pt also given GI cocktail, mylanta, zofran, and protonix with only minimal relief. Pt now resting quietly. Will continue to monitor.

## 2017-01-15 NOTE — PROGRESS NOTES
Patient has refused meals today, states she is \"too nauseous to eat anything. \" Has received antiemetic medication as indicated, see MAR, which has relieved nausea but not removed it completely. Patient has had one occurrence of emesis with an output of 100 mL, pink tinged but not visible blood. Patient is taking sucralfate (see MAR) which is pink in color. She refused her scheduled medications at 1800 due to the nausea, stated she would like to wait for about an hour to try to take the medication.

## 2017-01-16 ENCOUNTER — ANESTHESIA (OUTPATIENT)
Dept: ENDOSCOPY | Age: 62
DRG: 418 | End: 2017-01-16
Payer: COMMERCIAL

## 2017-01-16 ENCOUNTER — ANESTHESIA EVENT (OUTPATIENT)
Dept: ENDOSCOPY | Age: 62
DRG: 418 | End: 2017-01-16
Payer: COMMERCIAL

## 2017-01-16 LAB
AMYLASE SERPL-CCNC: 57 U/L (ref 25–115)
ANION GAP BLD CALC-SCNC: 9 MMOL/L (ref 5–15)
BUN SERPL-MCNC: 9 MG/DL (ref 6–20)
BUN/CREAT SERPL: 10 (ref 12–20)
CALCIUM SERPL-MCNC: 8.2 MG/DL (ref 8.5–10.1)
CHLORIDE SERPL-SCNC: 104 MMOL/L (ref 97–108)
CO2 SERPL-SCNC: 25 MMOL/L (ref 21–32)
CREAT SERPL-MCNC: 0.89 MG/DL (ref 0.55–1.02)
GLUCOSE SERPL-MCNC: 98 MG/DL (ref 65–100)
H PYLORI FROM TISSUE: NEGATIVE
KIT LOT NO., HCLOLOT: NORMAL
LIPASE SERPL-CCNC: 114 U/L (ref 73–393)
NEGATIVE CONTROL: NEGATIVE
POSITIVE CONTROL: POSITIVE
POTASSIUM SERPL-SCNC: 3.6 MMOL/L (ref 3.5–5.1)
SODIUM SERPL-SCNC: 138 MMOL/L (ref 136–145)
TROPONIN I SERPL-MCNC: <0.04 NG/ML

## 2017-01-16 PROCEDURE — 88305 TISSUE EXAM BY PATHOLOGIST: CPT | Performed by: INTERNAL MEDICINE

## 2017-01-16 PROCEDURE — 76060000031 HC ANESTHESIA FIRST 0.5 HR: Performed by: INTERNAL MEDICINE

## 2017-01-16 PROCEDURE — 74011250637 HC RX REV CODE- 250/637: Performed by: INTERNAL MEDICINE

## 2017-01-16 PROCEDURE — 87077 CULTURE AEROBIC IDENTIFY: CPT | Performed by: INTERNAL MEDICINE

## 2017-01-16 PROCEDURE — 74011250636 HC RX REV CODE- 250/636

## 2017-01-16 PROCEDURE — 77030019988 HC FCPS ENDOSC DISP BSC -B: Performed by: INTERNAL MEDICINE

## 2017-01-16 PROCEDURE — 76040000019: Performed by: INTERNAL MEDICINE

## 2017-01-16 PROCEDURE — 65270000029 HC RM PRIVATE

## 2017-01-16 PROCEDURE — 82150 ASSAY OF AMYLASE: CPT | Performed by: INTERNAL MEDICINE

## 2017-01-16 PROCEDURE — 0DB68ZX EXCISION OF STOMACH, VIA NATURAL OR ARTIFICIAL OPENING ENDOSCOPIC, DIAGNOSTIC: ICD-10-PCS | Performed by: INTERNAL MEDICINE

## 2017-01-16 PROCEDURE — 36415 COLL VENOUS BLD VENIPUNCTURE: CPT | Performed by: INTERNAL MEDICINE

## 2017-01-16 PROCEDURE — 74011000258 HC RX REV CODE- 258: Performed by: INTERNAL MEDICINE

## 2017-01-16 PROCEDURE — 74011250636 HC RX REV CODE- 250/636: Performed by: INTERNAL MEDICINE

## 2017-01-16 PROCEDURE — C9113 INJ PANTOPRAZOLE SODIUM, VIA: HCPCS | Performed by: INTERNAL MEDICINE

## 2017-01-16 PROCEDURE — 84484 ASSAY OF TROPONIN QUANT: CPT | Performed by: INTERNAL MEDICINE

## 2017-01-16 PROCEDURE — 80048 BASIC METABOLIC PNL TOTAL CA: CPT | Performed by: INTERNAL MEDICINE

## 2017-01-16 PROCEDURE — 74011000250 HC RX REV CODE- 250: Performed by: INTERNAL MEDICINE

## 2017-01-16 PROCEDURE — 74011250637 HC RX REV CODE- 250/637: Performed by: HOSPITALIST

## 2017-01-16 PROCEDURE — 0DB48ZX EXCISION OF ESOPHAGOGASTRIC JUNCTION, VIA NATURAL OR ARTIFICIAL OPENING ENDOSCOPIC, DIAGNOSTIC: ICD-10-PCS | Performed by: INTERNAL MEDICINE

## 2017-01-16 PROCEDURE — 74011000250 HC RX REV CODE- 250

## 2017-01-16 PROCEDURE — 74011250636 HC RX REV CODE- 250/636: Performed by: HOSPITALIST

## 2017-01-16 PROCEDURE — 83690 ASSAY OF LIPASE: CPT | Performed by: INTERNAL MEDICINE

## 2017-01-16 RX ORDER — DEXTROMETHORPHAN/PSEUDOEPHED 2.5-7.5/.8
1.2 DROPS ORAL
Status: DISCONTINUED | OUTPATIENT
Start: 2017-01-16 | End: 2017-01-16 | Stop reason: HOSPADM

## 2017-01-16 RX ORDER — GLYCOPYRROLATE 0.2 MG/ML
INJECTION INTRAMUSCULAR; INTRAVENOUS AS NEEDED
Status: DISCONTINUED | OUTPATIENT
Start: 2017-01-16 | End: 2017-01-16 | Stop reason: HOSPADM

## 2017-01-16 RX ORDER — SODIUM CHLORIDE 0.9 % (FLUSH) 0.9 %
5-10 SYRINGE (ML) INJECTION EVERY 8 HOURS
Status: COMPLETED | OUTPATIENT
Start: 2017-01-16 | End: 2017-01-16

## 2017-01-16 RX ORDER — MIDAZOLAM HYDROCHLORIDE 1 MG/ML
1-2 INJECTION, SOLUTION INTRAMUSCULAR; INTRAVENOUS
Status: DISCONTINUED | OUTPATIENT
Start: 2017-01-16 | End: 2017-01-16 | Stop reason: HOSPADM

## 2017-01-16 RX ORDER — LIDOCAINE HYDROCHLORIDE 20 MG/ML
INJECTION, SOLUTION EPIDURAL; INFILTRATION; INTRACAUDAL; PERINEURAL AS NEEDED
Status: DISCONTINUED | OUTPATIENT
Start: 2017-01-16 | End: 2017-01-16 | Stop reason: HOSPADM

## 2017-01-16 RX ORDER — EPINEPHRINE 0.1 MG/ML
1 INJECTION INTRACARDIAC; INTRAVENOUS
Status: DISCONTINUED | OUTPATIENT
Start: 2017-01-16 | End: 2017-01-16 | Stop reason: HOSPADM

## 2017-01-16 RX ORDER — ATROPINE SULFATE 0.1 MG/ML
0.5 INJECTION INTRAVENOUS
Status: DISCONTINUED | OUTPATIENT
Start: 2017-01-16 | End: 2017-01-16 | Stop reason: HOSPADM

## 2017-01-16 RX ORDER — NALOXONE HYDROCHLORIDE 0.4 MG/ML
0.4 INJECTION, SOLUTION INTRAMUSCULAR; INTRAVENOUS; SUBCUTANEOUS
Status: DISCONTINUED | OUTPATIENT
Start: 2017-01-16 | End: 2017-01-16 | Stop reason: HOSPADM

## 2017-01-16 RX ORDER — SODIUM CHLORIDE 0.9 % (FLUSH) 0.9 %
5-10 SYRINGE (ML) INJECTION EVERY 8 HOURS
Status: ACTIVE | OUTPATIENT
Start: 2017-01-16 | End: 2017-01-16

## 2017-01-16 RX ORDER — SODIUM CHLORIDE 0.9 % (FLUSH) 0.9 %
5-10 SYRINGE (ML) INJECTION AS NEEDED
Status: ACTIVE | OUTPATIENT
Start: 2017-01-16 | End: 2017-01-16

## 2017-01-16 RX ORDER — PROPOFOL 10 MG/ML
INJECTION, EMULSION INTRAVENOUS AS NEEDED
Status: DISCONTINUED | OUTPATIENT
Start: 2017-01-16 | End: 2017-01-16 | Stop reason: HOSPADM

## 2017-01-16 RX ORDER — FLUMAZENIL 0.1 MG/ML
0.2 INJECTION INTRAVENOUS
Status: DISCONTINUED | OUTPATIENT
Start: 2017-01-16 | End: 2017-01-16 | Stop reason: HOSPADM

## 2017-01-16 RX ORDER — SODIUM CHLORIDE 9 MG/ML
100 INJECTION, SOLUTION INTRAVENOUS CONTINUOUS
Status: DISPENSED | OUTPATIENT
Start: 2017-01-16 | End: 2017-01-16

## 2017-01-16 RX ORDER — MIDAZOLAM HYDROCHLORIDE 1 MG/ML
.25-5 INJECTION, SOLUTION INTRAMUSCULAR; INTRAVENOUS
Status: DISCONTINUED | OUTPATIENT
Start: 2017-01-16 | End: 2017-01-16 | Stop reason: HOSPADM

## 2017-01-16 RX ADMIN — CEFTRIAXONE 1 G: 1 INJECTION, POWDER, FOR SOLUTION INTRAMUSCULAR; INTRAVENOUS at 00:32

## 2017-01-16 RX ADMIN — OXYCODONE HYDROCHLORIDE AND ACETAMINOPHEN 1 TABLET: 5; 325 TABLET ORAL at 08:32

## 2017-01-16 RX ADMIN — ONDANSETRON 4 MG: 2 INJECTION INTRAMUSCULAR; INTRAVENOUS at 14:54

## 2017-01-16 RX ADMIN — GLYCOPYRROLATE 0.2 MG: 0.2 INJECTION INTRAMUSCULAR; INTRAVENOUS at 15:41

## 2017-01-16 RX ADMIN — GABAPENTIN 100 MG: 100 CAPSULE ORAL at 08:26

## 2017-01-16 RX ADMIN — SODIUM CHLORIDE 40 MG: 9 INJECTION INTRAMUSCULAR; INTRAVENOUS; SUBCUTANEOUS at 08:26

## 2017-01-16 RX ADMIN — OXYCODONE HYDROCHLORIDE AND ACETAMINOPHEN 1 TABLET: 5; 325 TABLET ORAL at 00:32

## 2017-01-16 RX ADMIN — SODIUM CHLORIDE 40 MG: 9 INJECTION INTRAMUSCULAR; INTRAVENOUS; SUBCUTANEOUS at 21:13

## 2017-01-16 RX ADMIN — HEPARIN SODIUM 5000 UNITS: 5000 INJECTION, SOLUTION INTRAVENOUS; SUBCUTANEOUS at 03:31

## 2017-01-16 RX ADMIN — LIDOCAINE HYDROCHLORIDE 100 MG: 20 INJECTION, SOLUTION EPIDURAL; INFILTRATION; INTRACAUDAL; PERINEURAL at 15:41

## 2017-01-16 RX ADMIN — SUCRALFATE 1 G: 1 SUSPENSION ORAL at 17:34

## 2017-01-16 RX ADMIN — GABAPENTIN 100 MG: 100 CAPSULE ORAL at 21:14

## 2017-01-16 RX ADMIN — HEPARIN SODIUM 5000 UNITS: 5000 INJECTION, SOLUTION INTRAVENOUS; SUBCUTANEOUS at 21:13

## 2017-01-16 RX ADMIN — SODIUM CHLORIDE 100 ML/HR: 900 INJECTION, SOLUTION INTRAVENOUS at 15:20

## 2017-01-16 RX ADMIN — SUCRALFATE 1 G: 1 SUSPENSION ORAL at 08:39

## 2017-01-16 RX ADMIN — Medication 10 ML: at 16:49

## 2017-01-16 RX ADMIN — ONDANSETRON 4 MG: 2 INJECTION INTRAMUSCULAR; INTRAVENOUS at 08:32

## 2017-01-16 RX ADMIN — MORPHINE SULFATE 1 MG: 4 INJECTION, SOLUTION INTRAMUSCULAR; INTRAVENOUS at 21:11

## 2017-01-16 RX ADMIN — SUCRALFATE 1 G: 1 SUSPENSION ORAL at 21:13

## 2017-01-16 RX ADMIN — SODIUM CHLORIDE 100 ML/HR: 900 INJECTION, SOLUTION INTRAVENOUS at 20:43

## 2017-01-16 RX ADMIN — OXYCODONE HYDROCHLORIDE AND ACETAMINOPHEN 1 TABLET: 5; 325 TABLET ORAL at 19:06

## 2017-01-16 RX ADMIN — SODIUM CHLORIDE 100 ML/HR: 900 INJECTION, SOLUTION INTRAVENOUS at 07:38

## 2017-01-16 RX ADMIN — PROPOFOL 180 MG: 10 INJECTION, EMULSION INTRAVENOUS at 15:50

## 2017-01-16 RX ADMIN — ONDANSETRON 4 MG: 2 INJECTION INTRAMUSCULAR; INTRAVENOUS at 03:31

## 2017-01-16 NOTE — ROUTINE PROCESS
Bedside, Verbal and Written shift change report given to Jimmy Richards RN (oncoming nurse) by Jose Cruz Carroll RN (offgoing nurse). Report included the following information SBAR, Kardex, MAR and Recent Results.

## 2017-01-16 NOTE — PROGRESS NOTES
[facility]      NAME: Aroldo Ewing   :  1955  MRM:  [id number]    Date/Time: 2017  12:44 PM      Rachna Rausch MD  Hospitalist Progress Note        Interim Hospital Summary: 62 y.o. female whom presented on 2017 with        Assessment / Plan:    C/o severe epigastric pain: POA  PPI BID  Appreciate GI consult  For EGD  CT abd/pelvis reviewed    Intractable nausea and Vomiting: resolved  Likely secondary to UTI  Acute renal failure likely pre renal from volume depletion-resolved  -urine cx-not impressive, d/c abx  -cont IV fluids  -avoid nephrotoxic drugs  -dose meds as per renal function    HTN: POA  BP reasonably controlled off meds, watch closely     chronic pain: POA  Resume home meds      Code status: FULL   VTE PROPHYLAXIS:heparin                   Subjective:     Chief Complaint: abd pain improving, no more N/V      ROS:  Constitutional: negative for fevers, chills, sweats, fatigue, malaise, anorexia and weight loss   Eyes: negative for irritation, redness and icterus   Ears, nose, mouth, throat, and face: negative for ear drainage, earaches, nasal congestion, sore mouth and sore throat   Respiratory: negative for cough, sputum, hemoptysis, pleurisy/chest pain, asthma, wheezing or dyspnea on exertion   Cardiovascular: negative for dyspnea, palpitations, irregular heart beats, near-syncope, syncope, orthopnea, paroxysmal nocturnal dyspnea, lower extremity edema, tachypnea   Gastrointestinal: as per hpi  Genitourinary:negative for frequency and dysuria   Hematologic/lymphatic: negative for easy bruising, bleeding, lymphadenopathy, petechiae and coughing up blood   Musculoskeletal:negative for myalgias, arthralgias and muscle weakness   Neurological: negative for headaches and dizziness   Endocrine: Denies heat or cold intolerance       Objective:       Vitals:          Last 24hrs VS reviewed since prior progress note.  Most recent are:    Visit Vitals    /69    Pulse (!) 54    Temp 97.5 °F (36.4 °C)    Resp 12    Ht 5' 7\" (1.702 m)    Wt 82.5 kg (181 lb 14.1 oz)    SpO2 99%    BMI 28.49 kg/m2     SpO2 Readings from Last 6 Encounters:   01/16/17 99%   02/18/16 100%   04/15/13 97%            Intake/Output Summary (Last 24 hours) at 01/16/17 1730  Last data filed at 01/16/17 1551   Gross per 24 hour   Intake             4090 ml   Output              100 ml   Net             3990 ml          Exam:     Physical Exam:    Gen:  Well-developed, well-nourished, in no acute distress  HEENT:  Pink conjunctivae, PERRL, hearing intact to voice, moist mucous membranes  Neck:  Supple, without masses, thyroid non-tender  Resp:  No accessory muscle use, clear breath sounds without wheezes rales or rhonchi  Card:  No murmurs, normal S1, S2 without thrills, bruits or peripheral edema  Abd:  Soft, non-tender, non-distended, normoactive bowel sounds are present  Musc:  No cyanosis or clubbing  Skin:  No rashes or ulcers, skin turgor is good  Neuro:  Cranial nerves 3-12 are grossly intact  Psych:  Good insight, oriented to person, place and time, alert    Medications Reviewed: (see below)    Lab Data Reviewed: (see below)    ______________________________________________________________________    Medications:     Current Facility-Administered Medications   Medication Dose Route Frequency    0.9% sodium chloride infusion  100 mL/hr IntraVENous CONTINUOUS    sodium chloride (NS) flush 5-10 mL  5-10 mL IntraVENous Q8H    sodium chloride (NS) flush 5-10 mL  5-10 mL IntraVENous PRN    sodium chloride (NS) flush 5-10 mL  5-10 mL IntraVENous PRN    oxyCODONE-acetaminophen (PERCOCET) 5-325 mg per tablet 1 Tab  1 Tab Oral Q4H PRN    morphine injection 1 mg  1 mg IntraVENous Q6H PRN    simethicone (MYLICON) tablet 80 mg  80 mg Oral QID PRN    pantoprazole (PROTONIX) 40 mg in sodium chloride 0.9 % 10 mL injection  40 mg IntraVENous Q12H    sucralfate (CARAFATE) 100 mg/mL oral suspension 1 g  1 g Oral QID    sodium chloride (NS) flush 5-10 mL  5-10 mL IntraVENous PRN    0.9% sodium chloride infusion  100 mL/hr IntraVENous CONTINUOUS    ondansetron (ZOFRAN) injection 4 mg  4 mg IntraVENous Q6H PRN    heparin (porcine) injection 5,000 Units  5,000 Units SubCUTAneous Q8H    gabapentin (NEURONTIN) capsule 100 mg  100 mg Oral TID    hydrALAZINE (APRESOLINE) 20 mg/mL injection 10 mg  10 mg IntraVENous Q6H PRN    cefTRIAXone (ROCEPHIN) 1 g in 0.9% sodium chloride (MBP/ADV) 50 mL  1 g IntraVENous Q24H            Lab Review:     Recent Labs      01/15/17   0342  01/14/17   0413   WBC  7.2  8.5   HGB  11.9  12.0   HCT  34.5*  35.6   PLT  165  169     Recent Labs      01/16/17   0544  01/15/17   1233  01/15/17   0342  01/14/17   0413   NA  138   --   143  140   K  3.6   --   3.9  4.3   CL  104   --   108  108   CO2  25   --   25  22   GLU  98   --   88  105*   BUN  9   --   10  17   CREA  0.89   --   1.09*  1.40*   CA  8.2*   --   8.2*  8.1*   ALB   --   3.9   --   3.5   SGOT   --   22   --   13*   ALT   --   27   --   20     No components found for: GLPOC        Total time spent with patient: 30 [address] [facility] discussed with: Patient    Discussed:  Care Plan    Prophylaxis:  Lovenox    Disposition:  Home w/Family           ___________________________________________________    Attending Physician: Ly Judd MD

## 2017-01-16 NOTE — PROGRESS NOTES
Bedside shift change report given to Alec Bowman RN (oncoming nurse) by Armin Montes RN (offgoing nurse). Report included the following information SBAR and Kardex.

## 2017-01-16 NOTE — ANESTHESIA PREPROCEDURE EVALUATION
Anesthetic History   No history of anesthetic complications            Review of Systems / Medical History  Patient summary reviewed, nursing notes reviewed and pertinent labs reviewed    Pulmonary  Within defined limits                 Neuro/Psych   Within defined limits           Cardiovascular    Hypertension              Exercise tolerance: >4 METS  Comments: 4-2013 EKG:SB, LVH   GI/Hepatic/Renal     GERD          Comments: Abdominal pain Endo/Other  Within defined limits           Other Findings   Comments: Fibromyalgia         Physical Exam    Airway  Mallampati: III  TM Distance: 4 - 6 cm  Neck ROM: normal range of motion        Cardiovascular    Rhythm: regular  Rate: normal        Comments: slow Dental  No notable dental hx       Pulmonary  Breath sounds clear to auscultation               Abdominal  GI exam deferred       Other Findings            Anesthetic Plan    ASA: 2  Anesthesia type: total IV anesthesia          Induction: Intravenous  Anesthetic plan and risks discussed with: Patient

## 2017-01-16 NOTE — PROGRESS NOTES
GI PROGRESS NOTE    NAME:             Tyson Krause   :              1955   MRN:              [id number]   Admit Date:     2017  Todays Date:  2017      Subjective:           Still with significant lower chest and epigastric pain. No nausea or vomiting. Medications-reviewed     Current Facility-Administered Medications   Medication Dose Route Frequency    oxyCODONE-acetaminophen (PERCOCET) 5-325 mg per tablet 1 Tab  1 Tab Oral Q4H PRN    morphine injection 1 mg  1 mg IntraVENous Q6H PRN    simethicone (MYLICON) tablet 80 mg  80 mg Oral QID PRN    famotidine (PEPCID) tablet 20 mg  20 mg Oral BID    pantoprazole (PROTONIX) 40 mg in sodium chloride 0.9 % 10 mL injection  40 mg IntraVENous Q12H    sucralfate (CARAFATE) 100 mg/mL oral suspension 1 g  1 g Oral QID    sodium chloride (NS) flush 5-10 mL  5-10 mL IntraVENous PRN    0.9% sodium chloride infusion  100 mL/hr IntraVENous CONTINUOUS    ondansetron (ZOFRAN) injection 4 mg  4 mg IntraVENous Q6H PRN    heparin (porcine) injection 5,000 Units  5,000 Units SubCUTAneous Q8H    gabapentin (NEURONTIN) capsule 100 mg  100 mg Oral TID    hydrALAZINE (APRESOLINE) 20 mg/mL injection 10 mg  10 mg IntraVENous Q6H PRN    cefTRIAXone (ROCEPHIN) 1 g in 0.9% sodium chloride (MBP/ADV) 50 mL  1 g IntraVENous Q24H        Objective:   No data found.  1901 -  0700  In: 3890 [P.O.:200; I.V.:3690]  Out: 100     EXAM:     NEURO-a&o   HEENT-wnl   LUNGS-clear   COR-regular rate and rhythym   ABD-soft , no tenderness, no rebound, good bs     CT ABD:  IMPRESSION:  There is question of slight thickening in the region of pylorus/duodenal bulb  versus incomplete distention. No ductal dilatation is identified. Gallbladder is  mildly distended. The pancreas is within normal limits.      LABS:  Recent Labs      01/15/17   0342  17   0413   WBC  7.2  8.5   HGB  11.9  12.0   HCT  34.5*  35.6   PLT  165  169     Recent Labs      17 0650  01/15/17   0342  01/14/17   0413   NA  138  143  140   K  3.6  3.9  4.3   CL  104  108  108   CO2  25  25  22   BUN  9  10  17   CREA  0.89  1.09*  1.40*   GLU  98  88  105*   CA  8.2*  8.2*  8.1*     Recent Labs      01/15/17   1233  01/14/17   0413  01/13/17   1527   SGOT  22  13*  17   AP  136*  116  130*   TBILI  0.9  0.8  0.9   TP  7.9  7.1  8.2   ALB  3.9  3.5  4.0   GLOB  4.0  3.6  4.2*   LPSE   --    --   122   ALT  27  20  22                            Assessment:   1. Severe lower chest, epigastric pain. Hx of GERD, but could also be gallbladder, pancreatitis, PUD  2. CT shows questionable thickening region of pylorus, duodenal bulb  3. UTI---this would not cause her GI symptoms           Plan:   1. I have added lipase and amylase to this am labs  2. EGD at 3:30 today  3. NPO except meds  4. Continue IV pantoprazole  5. Does not need po pepcid too  6.  Continue carafate

## 2017-01-16 NOTE — PROCEDURES
Terrell Fransisca                   Endoscopic Gastroduodenoscopy Procedure Note      1/16/2017  Orlando Cabral  1955  [id number]    Procedure: Endoscopic Gastroduodenoscopy with biopsy    Indication: upper abdominal pain, CT scan suggests thickening duodenal bulb, pylorus     Pre-operative Diagnosis: see indication above    Post-operative Diagnosis: see findings below    : BRENDON Dickey MD    Referring Provider:  Tyron Feliz MD      Anesthesia/Sedation:  MAC anesthesia Propofol    Airway assessment: No airway problems anticipated    Pre-Procedural Exam:      Airway: clear, no airway problems anticipated  Heart: RRR, without gallops or rubs  Lungs: clear bilaterally without wheezes, crackles, or rhonchi  Abdomen: soft, nontender, nondistended, bowel sounds present  Mental Status: awake, alert and oriented to person, place and time       Procedure Details     After infomed consent was obtained for the procedure, with all risks and benefits of procedure explained the patient was taken to the endoscopy suite and placed in the left lateral decubitus position. Following sequential administration of sedation as per above, the endoscope was inserted into the mouth and advanced under direct vision to second portion of the duodenum. A careful inspection was made as the gastroscope was withdrawn, including a retroflexed view of the proximal stomach; findings and interventions are described below. Findings:   Esophagus:normal mucosa, biopsied GE junction  Stomach: multiple gastric polyp in fundus and body up to 8 mm, biopsied. Antrum biopsied for pyloritek. Duodenum: normal    Therapies:  biopsy of esophagus    Specimens: biopsies of gastric polyps           Complications:   None; patient tolerated the procedure well. EBL:  None. Impression:      -gastric polyps otherwise normal egd    Recommendations:  -Continue acid suppression. , -Await pathology. , -Follow symptoms. , -try GI lite diet    Hemanth Duggan., MD1/16/2017

## 2017-01-16 NOTE — ANESTHESIA POSTPROCEDURE EVALUATION
Post-Anesthesia Evaluation and Assessment    Patient: Barak Fisher MRN: [id number]  SSN: [id number]    YOB: 1955  Age: 62 y.o. Sex: female       Cardiovascular Function/Vital Signs  Visit Vitals    /64    Pulse (!) 59    Temp 36.6 °C (97.8 °F)    Resp 12    Ht 5' 7\" (1.702 m)    Wt 82.5 kg (181 lb 14.1 oz)    SpO2 99%    BMI 28.49 kg/m2       Patient is status post total IV anesthesia anesthesia for Procedure(s):  ESOPHAGOGASTRODUODENOSCOPY (EGD)  ESOPHAGOGASTRODUODENAL (EGD) BIOPSY. Nausea/Vomiting: None    Postoperative hydration reviewed and adequate. Pain:  Pain Scale 1: Numeric (0 - 10) (01/16/17 1620)  Pain Intensity 1: 0 (01/16/17 1620)   Managed    Neurological Status: At baseline    Mental Status and Level of Consciousness: Arousable    Pulmonary Status:   O2 Device: Room air (01/16/17 1620)   Adequate oxygenation and airway patent    Complications related to anesthesia: None    Post-anesthesia assessment completed.  No concerns    Signed By: Becky Doan DO     January 16, 2017

## 2017-01-16 NOTE — INTERDISCIPLINARY ROUNDS
Renal  Unit Interdisciplinary rounds were held today to discuss patient plan of care and outcomes. The interdisciplinary team collaborated to facilitate discharge planning needs. The following members were present; Neuro Clinical Care Lead, Pharmacist, Primary Nurse,   Physician, and Case Management.      PLAN:  EGD today

## 2017-01-16 NOTE — PROGRESS NOTES
Pt admitted with UTI. [place], has Rodrigo, full code, PCP is Dr Rianna Ewing, went to [facility] on Tuesday, is independent with adls, uses no dme, works, and anticipates d/c to home when stable. Care Management Interventions  PCP Verified by CM:  Yes  Mode of Transport at Discharge: Self  MyChart Signup: No  Discharge Durable Medical Equipment: No  Health Maintenance Reviewed: Yes  Physical Therapy Consult: No  Occupational Therapy Consult: No  Speech Therapy Consult: No  Current Support Network: Lives with Spouse  Confirm Follow Up Transport: Family  Plan discussed with Pt/Family/Caregiver: Yes  Freedom of Choice Offered: Yes  Discharge Location  Discharge Placement: Home

## 2017-01-16 NOTE — ROUTINE PROCESS
TRANSFER - IN REPORT:    Verbal report received from JOEY Boudreaux(name) on [facility]  being received from 3252(unit) for ordered procedure      Report consisted of patients Situation, Background, Assessment and   Recommendations(SBAR). Information from the following report(s) SBAR, Intake/Output, MAR, Recent Results and Med Rec Status was reviewed with the receiving nurse. Opportunity for questions and clarification was provided. Assessment completed upon patients arrival to unit and care assumed.

## 2017-01-16 NOTE — PROGRESS NOTES
Anesthesia reports 180 mg Propofol, 100 mg Lidocaine, 0.2 mg Robinul and 350 ml of Normal Saline were given during procedure. Received report from anesthesia staff on vital signs and status of patient.

## 2017-01-17 ENCOUNTER — APPOINTMENT (OUTPATIENT)
Dept: NUCLEAR MEDICINE | Age: 62
DRG: 418 | End: 2017-01-17
Attending: INTERNAL MEDICINE
Payer: COMMERCIAL

## 2017-01-17 ENCOUNTER — APPOINTMENT (OUTPATIENT)
Dept: ULTRASOUND IMAGING | Age: 62
DRG: 418 | End: 2017-01-17
Attending: INTERNAL MEDICINE
Payer: COMMERCIAL

## 2017-01-17 LAB
BASOPHILS # BLD AUTO: 0 K/UL (ref 0–0.1)
BASOPHILS # BLD: 1 % (ref 0–1)
EOSINOPHIL # BLD: 0.2 K/UL (ref 0–0.4)
EOSINOPHIL NFR BLD: 3 % (ref 0–7)
ERYTHROCYTE [DISTWIDTH] IN BLOOD BY AUTOMATED COUNT: 12.5 % (ref 11.5–14.5)
HCT VFR BLD AUTO: 34.7 % (ref 35–47)
HGB BLD-MCNC: 11.9 G/DL (ref 11.5–16)
LYMPHOCYTES # BLD AUTO: 26 % (ref 12–49)
LYMPHOCYTES # BLD: 1.7 K/UL (ref 0.8–3.5)
MCH RBC QN AUTO: 30 PG (ref 26–34)
MCHC RBC AUTO-ENTMCNC: 34.3 G/DL (ref 30–36.5)
MCV RBC AUTO: 87.4 FL (ref 80–99)
MONOCYTES # BLD: 0.3 K/UL (ref 0–1)
MONOCYTES NFR BLD AUTO: 4 % (ref 5–13)
NEUTS SEG # BLD: 4.3 K/UL (ref 1.8–8)
NEUTS SEG NFR BLD AUTO: 66 % (ref 32–75)
PLATELET # BLD AUTO: 169 K/UL (ref 150–400)
RBC # BLD AUTO: 3.97 M/UL (ref 3.8–5.2)
WBC # BLD AUTO: 6.5 K/UL (ref 3.6–11)

## 2017-01-17 PROCEDURE — 85025 COMPLETE CBC W/AUTO DIFF WBC: CPT | Performed by: SURGERY

## 2017-01-17 PROCEDURE — 74011250636 HC RX REV CODE- 250/636: Performed by: HOSPITALIST

## 2017-01-17 PROCEDURE — 74011250636 HC RX REV CODE- 250/636

## 2017-01-17 PROCEDURE — 77030018846 HC SOL IRR STRL H20 ICUM -A

## 2017-01-17 PROCEDURE — 65270000029 HC RM PRIVATE

## 2017-01-17 PROCEDURE — 74011000250 HC RX REV CODE- 250: Performed by: INTERNAL MEDICINE

## 2017-01-17 PROCEDURE — 74011250636 HC RX REV CODE- 250/636: Performed by: INTERNAL MEDICINE

## 2017-01-17 PROCEDURE — A9537 TC99M MEBROFENIN: HCPCS

## 2017-01-17 PROCEDURE — 76700 US EXAM ABDOM COMPLETE: CPT

## 2017-01-17 PROCEDURE — 36415 COLL VENOUS BLD VENIPUNCTURE: CPT | Performed by: SURGERY

## 2017-01-17 PROCEDURE — 74011250637 HC RX REV CODE- 250/637: Performed by: HOSPITALIST

## 2017-01-17 PROCEDURE — 74011250636 HC RX REV CODE- 250/636: Performed by: SURGERY

## 2017-01-17 PROCEDURE — 74011250637 HC RX REV CODE- 250/637: Performed by: INTERNAL MEDICINE

## 2017-01-17 PROCEDURE — C9113 INJ PANTOPRAZOLE SODIUM, VIA: HCPCS | Performed by: INTERNAL MEDICINE

## 2017-01-17 RX ORDER — MORPHINE SULFATE 2 MG/ML
2 INJECTION, SOLUTION INTRAMUSCULAR; INTRAVENOUS ONCE
Status: DISCONTINUED | OUTPATIENT
Start: 2017-01-17 | End: 2017-01-17

## 2017-01-17 RX ORDER — HYDROMORPHONE HYDROCHLORIDE 1 MG/ML
.5-1 INJECTION, SOLUTION INTRAMUSCULAR; INTRAVENOUS; SUBCUTANEOUS
Status: DISCONTINUED | OUTPATIENT
Start: 2017-01-17 | End: 2017-01-19

## 2017-01-17 RX ADMIN — HEPARIN SODIUM 5000 UNITS: 5000 INJECTION, SOLUTION INTRAVENOUS; SUBCUTANEOUS at 20:49

## 2017-01-17 RX ADMIN — ONDANSETRON 4 MG: 2 INJECTION INTRAMUSCULAR; INTRAVENOUS at 20:40

## 2017-01-17 RX ADMIN — HEPARIN SODIUM 5000 UNITS: 5000 INJECTION, SOLUTION INTRAVENOUS; SUBCUTANEOUS at 14:33

## 2017-01-17 RX ADMIN — OXYCODONE HYDROCHLORIDE AND ACETAMINOPHEN 1 TABLET: 5; 325 TABLET ORAL at 00:32

## 2017-01-17 RX ADMIN — Medication 10 ML: at 07:06

## 2017-01-17 RX ADMIN — GABAPENTIN 100 MG: 100 CAPSULE ORAL at 16:36

## 2017-01-17 RX ADMIN — MORPHINE SULFATE 1 MG: 4 INJECTION, SOLUTION INTRAMUSCULAR; INTRAVENOUS at 07:06

## 2017-01-17 RX ADMIN — ONDANSETRON 4 MG: 2 INJECTION INTRAMUSCULAR; INTRAVENOUS at 14:34

## 2017-01-17 RX ADMIN — GABAPENTIN 100 MG: 100 CAPSULE ORAL at 22:05

## 2017-01-17 RX ADMIN — SODIUM CHLORIDE 40 MG: 9 INJECTION INTRAMUSCULAR; INTRAVENOUS; SUBCUTANEOUS at 20:52

## 2017-01-17 RX ADMIN — SODIUM CHLORIDE 40 MG: 9 INJECTION INTRAMUSCULAR; INTRAVENOUS; SUBCUTANEOUS at 08:53

## 2017-01-17 RX ADMIN — SODIUM CHLORIDE 100 ML/HR: 900 INJECTION, SOLUTION INTRAVENOUS at 14:49

## 2017-01-17 RX ADMIN — SUCRALFATE 1 G: 1 SUSPENSION ORAL at 14:34

## 2017-01-17 RX ADMIN — HYDROMORPHONE HYDROCHLORIDE 1 MG: 1 INJECTION, SOLUTION INTRAMUSCULAR; INTRAVENOUS; SUBCUTANEOUS at 20:39

## 2017-01-17 RX ADMIN — HYDROMORPHONE HYDROCHLORIDE 1 MG: 1 INJECTION, SOLUTION INTRAMUSCULAR; INTRAVENOUS; SUBCUTANEOUS at 22:37

## 2017-01-17 RX ADMIN — SINCALIDE 1.65 MCG: 5 INJECTION, POWDER, LYOPHILIZED, FOR SOLUTION INTRAVENOUS at 13:28

## 2017-01-17 RX ADMIN — SUCRALFATE 1 G: 1 SUSPENSION ORAL at 22:05

## 2017-01-17 RX ADMIN — ONDANSETRON 4 MG: 2 INJECTION INTRAMUSCULAR; INTRAVENOUS at 07:05

## 2017-01-17 RX ADMIN — Medication 10 ML: at 04:30

## 2017-01-17 RX ADMIN — SODIUM CHLORIDE 100 ML/HR: 900 INJECTION, SOLUTION INTRAVENOUS at 06:53

## 2017-01-17 RX ADMIN — SODIUM CHLORIDE 100 ML/HR: 900 INJECTION, SOLUTION INTRAVENOUS at 06:54

## 2017-01-17 RX ADMIN — SUCRALFATE 1 G: 1 SUSPENSION ORAL at 18:37

## 2017-01-17 RX ADMIN — MORPHINE SULFATE 1 MG: 4 INJECTION, SOLUTION INTRAMUSCULAR; INTRAVENOUS at 14:34

## 2017-01-17 RX ADMIN — HEPARIN SODIUM 5000 UNITS: 5000 INJECTION, SOLUTION INTRAVENOUS; SUBCUTANEOUS at 04:29

## 2017-01-17 NOTE — PROGRESS NOTES
Bedside and Verbal shift change report given to [facility]  VALERIY (oncoming nurse) by Alesia Ayala (offgoing nurse). Report included the following information SBAR, Kardex, Intake/Output, MAR, Recent Results and Med Rec Status.

## 2017-01-17 NOTE — PROGRESS NOTES
GI PROGRESS NOTE    NAME:             Broderick Boggs   :              1955   MRN:              [id number]   Admit Date:     2017  Todays Date:  2017      Subjective:           Still with significant upper abdominal pain. Decreased nausea and vomiting. Medications-reviewed     Current Facility-Administered Medications   Medication Dose Route Frequency    oxyCODONE-acetaminophen (PERCOCET) 5-325 mg per tablet 1 Tab  1 Tab Oral Q4H PRN    morphine injection 1 mg  1 mg IntraVENous Q6H PRN    simethicone (MYLICON) tablet 80 mg  80 mg Oral QID PRN    pantoprazole (PROTONIX) 40 mg in sodium chloride 0.9 % 10 mL injection  40 mg IntraVENous Q12H    sucralfate (CARAFATE) 100 mg/mL oral suspension 1 g  1 g Oral QID    sodium chloride (NS) flush 5-10 mL  5-10 mL IntraVENous PRN    0.9% sodium chloride infusion  100 mL/hr IntraVENous CONTINUOUS    ondansetron (ZOFRAN) injection 4 mg  4 mg IntraVENous Q6H PRN    heparin (porcine) injection 5,000 Units  5,000 Units SubCUTAneous Q8H    gabapentin (NEURONTIN) capsule 100 mg  100 mg Oral TID    hydrALAZINE (APRESOLINE) 20 mg/mL injection 10 mg  10 mg IntraVENous Q6H PRN        Objective:   Patient Vitals for the past 8 hrs:   BP Temp Pulse Resp SpO2   17 0804 154/78 97.8 °F (36.6 °C) (!) 50 16 97 %        01/15 1901 -  0700  In: 4280 [P.O.:240;  I.V.:4040]  Out: -     EXAM:     NEURO-a&o   HEENT-wnl   LUNGS-clear   COR-regular rate and rhythym   ABD-soft , no tenderness, no rebound, good bs       LABS:  Recent Labs      01/15/17   0342   WBC  7.2   HGB  11.9   HCT  34.5*   PLT  165     Recent Labs      17   0544  01/15/17   0342   NA  138  143   K  3.6  3.9   CL  104  108   CO2  25  25   BUN  9  10   CREA  0.89  1.09*   GLU  98  88   CA  8.2*  8.2*     Recent Labs      17   0544  01/15/17   1233   SGOT   --   22   AP   --   136*   TBILI   --   0.9   TP   --   7.9   ALB   --   3.9   GLOB   --   4.0   AML  57   --    LPSE  114 --    ALT   --   27                            Assessment:   1. Severe upper abd pain. Did not see anything on EGD to explain this. Need to evaluate the gallbladder. It did appear normal on CT. 2. UTI  3. GERD         Active Problems:    UTI (urinary tract infection) (1/13/2017)        Plan:   1. Sonogram gallbladder  2. HIDA scan  3. Continue pantoprazole  4.  Continue carafate

## 2017-01-17 NOTE — CONSULTS
Surgery Consult    Subjective:   Patient 62 y.o. female presents with abdominal pain. Onset of symptoms was gradual with gradually worsening course since that time. The pain is located in the epigastrium with radiation to the lower abdomen. Patient describes the pain as sharp, continuous and rated as severe. Pain has been associated with nausea and diarrhea. Patient denies vomiting and jaundice. Symptoms are aggravated by movement. Symptoms improve with none. Past history includes GERD. PH probe proven reflex followed by Dr. Missael Michelle. Pt reports recent symptoms are different. CT no cause for pain  EGD no cause for pain  US: + gallstones  HIDA: reviewed images (report pending): GB fills, decreased EF, but not reliable due to pt being on morphine      Past Medical & Surgical History:  Past Medical History   Diagnosis Date    Gastrointestinal disorder      acid reflux    Hypertension     Other ill-defined conditions(799.89)      fibromyalgia      Past Surgical History   Procedure Laterality Date    Hx gyn       , tubal ligation    Pr gerd tst w/ mucos impede electrod,>1hr  2016          Pr esophageal motility study w/interp&rpt  2016          Upper gi endoscopy,biopsy  2017             Social History:  Social History     Social History    Marital status:      Spouse name: N/A    Number of children: N/A    Years of education: N/A     Occupational History    Not on file. Social History Main Topics    Smoking status: Never Smoker    Smokeless tobacco: Not on file    Alcohol use No    Drug use: Not on file    Sexual activity: Not on file     Other Topics Concern    Not on file     Social History Narrative        Family History:  History reviewed. No pertinent family history. Prior to Admission Medications:  Prior to Admission Medications   Prescriptions Last Dose Informant Patient Reported?  Taking?   acetaminophen (TYLENOL) 500 mg tablet Unknown at Unknown time  Yes No   Sig: Take 500 mg by mouth every six (6) hours as needed for Pain.   gabapentin (NEURONTIN) 100 mg capsule 1/13/2017 at Unknown time  Yes Yes   Sig: Take 100 mg by mouth three (3) times daily. lansoprazole (PREVACID) 30 mg capsule 1/13/2017 at Unknown time  Yes Yes   Sig: Take 20 mg by mouth Daily (before breakfast). metoprolol (LOPRESSOR) 100 mg tablet 1/13/2017 at Unknown time  Yes Yes   Sig: Take 100 mg by mouth daily. Facility-Administered Medications: None       Allergies: Allergies   Allergen Reactions    Pcn [Penicillins] Hives       Review of Systems  A comprehensive review of systems was negative except for that written in the HPI. Objective:     Exam:    Visit Vitals    /84 (BP 1 Location: Left arm, BP Patient Position: At rest)    Pulse (!) 58    Temp 98 °F (36.7 °C)    Resp 16    Ht 5' 7\" (1.702 m)    Wt 82.5 kg (181 lb 14.1 oz)    SpO2 96%    BMI 28.49 kg/m2     General appearance: alert, cooperative, no distress, appears stated age  Head: Normocephalic, without obvious abnormality, atraumatic  Neck: supple, symmetrical, trachea midline, no adenopathy, thyroid: not enlarged, symmetric, no tenderness/mass/nodules, no carotid bruit and no JVD  Lungs: clear to auscultation bilaterally  Heart: regular rate and rhythm, S1, S2 normal, no murmur, click, rub or gallop  Abdomen: normal findings: umbilicus normal, no masses palpable, abnormal findings:  tenderness marked mostly epigastrium to the right of midline. Extremities: extremities normal, atraumatic, no cyanosis or edema  Skin: Skin color, texture, turgor normal. No rashes or lesions      Data Review  No results found for this or any previous visit (from the past 24 hour(s)). Assessment:     Active Problems:    UTI (urinary tract infection) (1/13/2017)    gallstones  GERD      Plan:     Reviewed finding with pt and family.   While her symptoms are not classic for biliary colic, in light of the workup and + gallstones, and new upper abdominal symptoms, she will likely benefit from removal of her gallbladder. I Recommend we proceed with a Laparoscopic Cholecystectomy with possible Intraoperative Cholangiogram, single site, robot assisted. Risks, Benefits, and Alternatives of the procedure were discussed with Aida Kaur and family including:  the risk of the anesthesia, bleeding, infection, injury to the intestines, injury to the ducts, and the lack of symptomatic improvement. The patient is agreeable to proceed. Timing of the surgery was discussed with Aida Kaur. As she is here with continues symptoms, she would like to proceed tomorrow. Will schedule. Repeat labs tomorrow. Thank you for this consult.

## 2017-01-17 NOTE — PROGRESS NOTES
Problem: Pain - Acute  Goal: *Pain is controlled to three or less  Outcome: Progressing Towards Goal  Assess pain Q4h & prn. Medicating with 1mg IV Morphine Q6h.

## 2017-01-18 ENCOUNTER — APPOINTMENT (OUTPATIENT)
Dept: GENERAL RADIOLOGY | Age: 62
DRG: 418 | End: 2017-01-18
Attending: INTERNAL MEDICINE
Payer: COMMERCIAL

## 2017-01-18 ENCOUNTER — APPOINTMENT (OUTPATIENT)
Dept: GENERAL RADIOLOGY | Age: 62
DRG: 418 | End: 2017-01-18
Attending: SURGERY
Payer: COMMERCIAL

## 2017-01-18 ENCOUNTER — ANESTHESIA (OUTPATIENT)
Dept: SURGERY | Age: 62
DRG: 418 | End: 2017-01-18
Payer: COMMERCIAL

## 2017-01-18 ENCOUNTER — ANESTHESIA EVENT (OUTPATIENT)
Dept: SURGERY | Age: 62
DRG: 418 | End: 2017-01-18
Payer: COMMERCIAL

## 2017-01-18 LAB
ALBUMIN SERPL BCP-MCNC: 3.5 G/DL (ref 3.5–5)
ALBUMIN/GLOB SERPL: 1 {RATIO} (ref 1.1–2.2)
ALP SERPL-CCNC: 311 U/L (ref 45–117)
ALT SERPL-CCNC: 379 U/L (ref 12–78)
ANION GAP BLD CALC-SCNC: 14 MMOL/L (ref 5–15)
AST SERPL W P-5'-P-CCNC: 190 U/L (ref 15–37)
ATRIAL RATE: 49 BPM
BASOPHILS # BLD AUTO: 0 K/UL (ref 0–0.1)
BASOPHILS # BLD: 1 % (ref 0–1)
BILIRUB SERPL-MCNC: 2.5 MG/DL (ref 0.2–1)
BUN SERPL-MCNC: 10 MG/DL (ref 6–20)
BUN/CREAT SERPL: 11 (ref 12–20)
CALCIUM SERPL-MCNC: 8.3 MG/DL (ref 8.5–10.1)
CALCULATED P AXIS, ECG09: 34 DEGREES
CALCULATED R AXIS, ECG10: 2 DEGREES
CALCULATED T AXIS, ECG11: 44 DEGREES
CHLORIDE SERPL-SCNC: 105 MMOL/L (ref 97–108)
CO2 SERPL-SCNC: 22 MMOL/L (ref 21–32)
CREAT SERPL-MCNC: 0.91 MG/DL (ref 0.55–1.02)
DIAGNOSIS, 93000: NORMAL
EOSINOPHIL # BLD: 0.2 K/UL (ref 0–0.4)
EOSINOPHIL NFR BLD: 2 % (ref 0–7)
ERYTHROCYTE [DISTWIDTH] IN BLOOD BY AUTOMATED COUNT: 12.6 % (ref 11.5–14.5)
GLOBULIN SER CALC-MCNC: 3.6 G/DL (ref 2–4)
GLUCOSE BLD STRIP.AUTO-MCNC: 64 MG/DL (ref 65–100)
GLUCOSE BLD STRIP.AUTO-MCNC: 74 MG/DL (ref 65–100)
GLUCOSE SERPL-MCNC: 62 MG/DL (ref 65–100)
HCT VFR BLD AUTO: 34.3 % (ref 35–47)
HGB BLD-MCNC: 11.7 G/DL (ref 11.5–16)
LIPASE SERPL-CCNC: 146 U/L (ref 73–393)
LYMPHOCYTES # BLD AUTO: 24 % (ref 12–49)
LYMPHOCYTES # BLD: 1.6 K/UL (ref 0.8–3.5)
MCH RBC QN AUTO: 29.6 PG (ref 26–34)
MCHC RBC AUTO-ENTMCNC: 34.1 G/DL (ref 30–36.5)
MCV RBC AUTO: 86.8 FL (ref 80–99)
MONOCYTES # BLD: 0.4 K/UL (ref 0–1)
MONOCYTES NFR BLD AUTO: 5 % (ref 5–13)
NEUTS SEG # BLD: 4.5 K/UL (ref 1.8–8)
NEUTS SEG NFR BLD AUTO: 68 % (ref 32–75)
P-R INTERVAL, ECG05: 152 MS
PLATELET # BLD AUTO: 177 K/UL (ref 150–400)
POTASSIUM SERPL-SCNC: 3.5 MMOL/L (ref 3.5–5.1)
PROT SERPL-MCNC: 7.1 G/DL (ref 6.4–8.2)
Q-T INTERVAL, ECG07: 492 MS
QRS DURATION, ECG06: 88 MS
QTC CALCULATION (BEZET), ECG08: 444 MS
RBC # BLD AUTO: 3.95 M/UL (ref 3.8–5.2)
SERVICE CMNT-IMP: ABNORMAL
SERVICE CMNT-IMP: NORMAL
SODIUM SERPL-SCNC: 141 MMOL/L (ref 136–145)
VENTRICULAR RATE, ECG03: 49 BPM
WBC # BLD AUTO: 6.7 K/UL (ref 3.6–11)

## 2017-01-18 PROCEDURE — 77030026265 HC CATH BILI STN RTVR BSC -B: Performed by: SURGERY

## 2017-01-18 PROCEDURE — 74011000250 HC RX REV CODE- 250: Performed by: SURGERY

## 2017-01-18 PROCEDURE — 74011636320 HC RX REV CODE- 636/320: Performed by: SURGERY

## 2017-01-18 PROCEDURE — 74011250636 HC RX REV CODE- 250/636: Performed by: INTERNAL MEDICINE

## 2017-01-18 PROCEDURE — 74011250636 HC RX REV CODE- 250/636: Performed by: SURGERY

## 2017-01-18 PROCEDURE — 65270000029 HC RM PRIVATE

## 2017-01-18 PROCEDURE — 77030011640 HC PAD GRND REM COVD -A: Performed by: SURGERY

## 2017-01-18 PROCEDURE — 77030020256 HC SOL INJ NACL 0.9%  500ML: Performed by: SURGERY

## 2017-01-18 PROCEDURE — 77030018836 HC SOL IRR NACL ICUM -A: Performed by: SURGERY

## 2017-01-18 PROCEDURE — 77030032490 HC SLV COMPR SCD KNE COVD -B: Performed by: SURGERY

## 2017-01-18 PROCEDURE — BF131ZZ FLUOROSCOPY OF GALLBLADDER AND BILE DUCTS USING LOW OSMOLAR CONTRAST: ICD-10-PCS | Performed by: SURGERY

## 2017-01-18 PROCEDURE — 8E0W4CZ ROBOTIC ASSISTED PROCEDURE OF TRUNK REGION, PERCUTANEOUS ENDOSCOPIC APPROACH: ICD-10-PCS | Performed by: SURGERY

## 2017-01-18 PROCEDURE — C9113 INJ PANTOPRAZOLE SODIUM, VIA: HCPCS | Performed by: INTERNAL MEDICINE

## 2017-01-18 PROCEDURE — 74011250636 HC RX REV CODE- 250/636

## 2017-01-18 PROCEDURE — 74300 X-RAY BILE DUCTS/PANCREAS: CPT

## 2017-01-18 PROCEDURE — 77030010803: Performed by: SURGERY

## 2017-01-18 PROCEDURE — 36415 COLL VENOUS BLD VENIPUNCTURE: CPT | Performed by: SURGERY

## 2017-01-18 PROCEDURE — 77030010939 HC CLP LIG TELE -B: Performed by: SURGERY

## 2017-01-18 PROCEDURE — 77010033678 HC OXYGEN DAILY

## 2017-01-18 PROCEDURE — 80053 COMPREHEN METABOLIC PANEL: CPT | Performed by: SURGERY

## 2017-01-18 PROCEDURE — 77030035277 HC OBTRTR BLDELSS DISP INTU -B: Performed by: SURGERY

## 2017-01-18 PROCEDURE — 74011250637 HC RX REV CODE- 250/637: Performed by: INTERNAL MEDICINE

## 2017-01-18 PROCEDURE — 77030034133 HC APPL ENDOSCP FLX SPRY BAXT -C: Performed by: SURGERY

## 2017-01-18 PROCEDURE — C1769 GUIDE WIRE: HCPCS | Performed by: SURGERY

## 2017-01-18 PROCEDURE — 77030010507 HC ADH SKN DERMBND J&J -B: Performed by: SURGERY

## 2017-01-18 PROCEDURE — 74011000254 HC RX REV CODE- 254

## 2017-01-18 PROCEDURE — 77030010100 HC SEAL ENDOSC AMR -B: Performed by: SURGERY

## 2017-01-18 PROCEDURE — 0FT44ZZ RESECTION OF GALLBLADDER, PERCUTANEOUS ENDOSCOPIC APPROACH: ICD-10-PCS | Performed by: SURGERY

## 2017-01-18 PROCEDURE — 77030010104 HC SEAL PRT ENDOSC BYRN -B: Performed by: SURGERY

## 2017-01-18 PROCEDURE — 77030031139 HC SUT VCRL2 J&J -A: Performed by: SURGERY

## 2017-01-18 PROCEDURE — 74011250636 HC RX REV CODE- 250/636: Performed by: ANESTHESIOLOGY

## 2017-01-18 PROCEDURE — 74330 X-RAY BILE/PANC ENDOSCOPY: CPT

## 2017-01-18 PROCEDURE — 77030035488 HC SEAL UNIV DISP INTU -C: Performed by: SURGERY

## 2017-01-18 PROCEDURE — 76210000016 HC OR PH I REC 1 TO 1.5 HR: Performed by: SURGERY

## 2017-01-18 PROCEDURE — 77030008756 HC TU IRR SUC STRY -B: Performed by: SURGERY

## 2017-01-18 PROCEDURE — 88304 TISSUE EXAM BY PATHOLOGIST: CPT | Performed by: SURGERY

## 2017-01-18 PROCEDURE — 77030018846 HC SOL IRR STRL H20 ICUM -A: Performed by: SURGERY

## 2017-01-18 PROCEDURE — 82962 GLUCOSE BLOOD TEST: CPT

## 2017-01-18 PROCEDURE — 77030020263 HC SOL INJ SOD CL0.9% LFCR 1000ML: Performed by: SURGERY

## 2017-01-18 PROCEDURE — 77030013532 HC SEAL FBRN TISSL RDYTUSE 4ML BAXT -C: Performed by: SURGERY

## 2017-01-18 PROCEDURE — 74011000250 HC RX REV CODE- 250: Performed by: INTERNAL MEDICINE

## 2017-01-18 PROCEDURE — 77030004818 HC CATH CHOLGM TELE -B: Performed by: SURGERY

## 2017-01-18 PROCEDURE — 0FCC8ZZ EXTIRPATION OF MATTER FROM AMPULLA OF VATER, VIA NATURAL OR ARTIFICIAL OPENING ENDOSCOPIC: ICD-10-PCS | Performed by: INTERNAL MEDICINE

## 2017-01-18 PROCEDURE — C1758 CATHETER, URETERAL: HCPCS | Performed by: SURGERY

## 2017-01-18 PROCEDURE — 77030029216 HC PRT SGL SITE DAVINCI INTU -C: Performed by: SURGERY

## 2017-01-18 PROCEDURE — 77030008684 HC TU ET CUF COVD -B: Performed by: ANESTHESIOLOGY

## 2017-01-18 PROCEDURE — 77030026438 HC STYL ET INTUB CARD -A: Performed by: ANESTHESIOLOGY

## 2017-01-18 PROCEDURE — 51798 US URINE CAPACITY MEASURE: CPT

## 2017-01-18 PROCEDURE — 83690 ASSAY OF LIPASE: CPT | Performed by: SURGERY

## 2017-01-18 PROCEDURE — 77030019908 HC STETH ESOPH SIMS -A: Performed by: ANESTHESIOLOGY

## 2017-01-18 PROCEDURE — 77030020703 HC SEAL CANN DISP INTU -B: Performed by: SURGERY

## 2017-01-18 PROCEDURE — 77030007288 HC DEV LOK BILI BSC -A: Performed by: SURGERY

## 2017-01-18 PROCEDURE — 85025 COMPLETE CBC W/AUTO DIFF WBC: CPT | Performed by: HOSPITALIST

## 2017-01-18 PROCEDURE — 77030008771 HC TU NG SALEM SUMP -A: Performed by: ANESTHESIOLOGY

## 2017-01-18 PROCEDURE — 77030016151 HC PROTCTR LNS DFOG COVD -B: Performed by: SURGERY

## 2017-01-18 PROCEDURE — 77030033138 HC SUT PGA STRATFX J&J -B: Performed by: SURGERY

## 2017-01-18 PROCEDURE — 93005 ELECTROCARDIOGRAM TRACING: CPT

## 2017-01-18 PROCEDURE — 76060000037 HC ANESTHESIA 3 TO 3.5 HR: Performed by: SURGERY

## 2017-01-18 PROCEDURE — 77030011943

## 2017-01-18 PROCEDURE — 76010000878 HC OR TIME 3 TO 3.5HR INTENSV - TIER 2: Performed by: SURGERY

## 2017-01-18 PROCEDURE — 74011000250 HC RX REV CODE- 250

## 2017-01-18 RX ORDER — GLYCOPYRROLATE 0.2 MG/ML
INJECTION INTRAMUSCULAR; INTRAVENOUS AS NEEDED
Status: DISCONTINUED | OUTPATIENT
Start: 2017-01-18 | End: 2017-01-18 | Stop reason: HOSPADM

## 2017-01-18 RX ORDER — MIDAZOLAM HYDROCHLORIDE 1 MG/ML
1 INJECTION, SOLUTION INTRAMUSCULAR; INTRAVENOUS AS NEEDED
Status: DISCONTINUED | OUTPATIENT
Start: 2017-01-18 | End: 2017-01-18 | Stop reason: HOSPADM

## 2017-01-18 RX ORDER — OXYCODONE AND ACETAMINOPHEN 5; 325 MG/1; MG/1
1 TABLET ORAL AS NEEDED
Status: DISCONTINUED | OUTPATIENT
Start: 2017-01-18 | End: 2017-01-18 | Stop reason: HOSPADM

## 2017-01-18 RX ORDER — NEOSTIGMINE METHYLSULFATE 1 MG/ML
INJECTION INTRAVENOUS AS NEEDED
Status: DISCONTINUED | OUTPATIENT
Start: 2017-01-18 | End: 2017-01-18 | Stop reason: HOSPADM

## 2017-01-18 RX ORDER — SODIUM CHLORIDE 0.9 % (FLUSH) 0.9 %
5-10 SYRINGE (ML) INJECTION AS NEEDED
Status: DISCONTINUED | OUTPATIENT
Start: 2017-01-18 | End: 2017-01-18 | Stop reason: HOSPADM

## 2017-01-18 RX ORDER — SODIUM CHLORIDE, SODIUM LACTATE, POTASSIUM CHLORIDE, CALCIUM CHLORIDE 600; 310; 30; 20 MG/100ML; MG/100ML; MG/100ML; MG/100ML
75 INJECTION, SOLUTION INTRAVENOUS CONTINUOUS
Status: DISCONTINUED | OUTPATIENT
Start: 2017-01-18 | End: 2017-01-18 | Stop reason: HOSPADM

## 2017-01-18 RX ORDER — SUCCINYLCHOLINE CHLORIDE 20 MG/ML
INJECTION INTRAMUSCULAR; INTRAVENOUS AS NEEDED
Status: DISCONTINUED | OUTPATIENT
Start: 2017-01-18 | End: 2017-01-18 | Stop reason: HOSPADM

## 2017-01-18 RX ORDER — HYDROMORPHONE HYDROCHLORIDE 1 MG/ML
0.2 INJECTION, SOLUTION INTRAMUSCULAR; INTRAVENOUS; SUBCUTANEOUS
Status: DISCONTINUED | OUTPATIENT
Start: 2017-01-18 | End: 2017-01-18 | Stop reason: HOSPADM

## 2017-01-18 RX ORDER — ROCURONIUM BROMIDE 10 MG/ML
INJECTION, SOLUTION INTRAVENOUS AS NEEDED
Status: DISCONTINUED | OUTPATIENT
Start: 2017-01-18 | End: 2017-01-18 | Stop reason: HOSPADM

## 2017-01-18 RX ORDER — DIPHENHYDRAMINE HYDROCHLORIDE 50 MG/ML
12.5 INJECTION, SOLUTION INTRAMUSCULAR; INTRAVENOUS AS NEEDED
Status: DISCONTINUED | OUTPATIENT
Start: 2017-01-18 | End: 2017-01-18 | Stop reason: HOSPADM

## 2017-01-18 RX ORDER — FENTANYL CITRATE 50 UG/ML
50 INJECTION, SOLUTION INTRAMUSCULAR; INTRAVENOUS AS NEEDED
Status: DISCONTINUED | OUTPATIENT
Start: 2017-01-18 | End: 2017-01-18 | Stop reason: HOSPADM

## 2017-01-18 RX ORDER — CEFAZOLIN SODIUM IN 0.9 % NACL 2 G/100 ML
2 PLASTIC BAG, INJECTION (ML) INTRAVENOUS
Status: COMPLETED | OUTPATIENT
Start: 2017-01-18 | End: 2017-01-18

## 2017-01-18 RX ORDER — DEXAMETHASONE SODIUM PHOSPHATE 4 MG/ML
INJECTION, SOLUTION INTRA-ARTICULAR; INTRALESIONAL; INTRAMUSCULAR; INTRAVENOUS; SOFT TISSUE AS NEEDED
Status: DISCONTINUED | OUTPATIENT
Start: 2017-01-18 | End: 2017-01-18 | Stop reason: HOSPADM

## 2017-01-18 RX ORDER — LIDOCAINE HYDROCHLORIDE 20 MG/ML
INJECTION, SOLUTION EPIDURAL; INFILTRATION; INTRACAUDAL; PERINEURAL AS NEEDED
Status: DISCONTINUED | OUTPATIENT
Start: 2017-01-18 | End: 2017-01-18 | Stop reason: HOSPADM

## 2017-01-18 RX ORDER — LIDOCAINE HYDROCHLORIDE 10 MG/ML
0.1 INJECTION, SOLUTION EPIDURAL; INFILTRATION; INTRACAUDAL; PERINEURAL AS NEEDED
Status: DISCONTINUED | OUTPATIENT
Start: 2017-01-18 | End: 2017-01-18 | Stop reason: HOSPADM

## 2017-01-18 RX ORDER — MORPHINE SULFATE 10 MG/ML
2 INJECTION, SOLUTION INTRAMUSCULAR; INTRAVENOUS
Status: DISCONTINUED | OUTPATIENT
Start: 2017-01-18 | End: 2017-01-18 | Stop reason: HOSPADM

## 2017-01-18 RX ORDER — ONDANSETRON 2 MG/ML
INJECTION INTRAMUSCULAR; INTRAVENOUS AS NEEDED
Status: DISCONTINUED | OUTPATIENT
Start: 2017-01-18 | End: 2017-01-18 | Stop reason: HOSPADM

## 2017-01-18 RX ORDER — PROPOFOL 10 MG/ML
INJECTION, EMULSION INTRAVENOUS AS NEEDED
Status: DISCONTINUED | OUTPATIENT
Start: 2017-01-18 | End: 2017-01-18 | Stop reason: HOSPADM

## 2017-01-18 RX ORDER — BUPIVACAINE HYDROCHLORIDE AND EPINEPHRINE 5; 5 MG/ML; UG/ML
INJECTION, SOLUTION EPIDURAL; INTRACAUDAL; PERINEURAL AS NEEDED
Status: DISCONTINUED | OUTPATIENT
Start: 2017-01-18 | End: 2017-01-18 | Stop reason: HOSPADM

## 2017-01-18 RX ORDER — FENTANYL CITRATE 50 UG/ML
25 INJECTION, SOLUTION INTRAMUSCULAR; INTRAVENOUS
Status: DISCONTINUED | OUTPATIENT
Start: 2017-01-18 | End: 2017-01-18 | Stop reason: HOSPADM

## 2017-01-18 RX ORDER — INDOCYANINE GREEN AND WATER 25 MG
KIT INJECTION AS NEEDED
Status: DISCONTINUED | OUTPATIENT
Start: 2017-01-18 | End: 2017-01-18 | Stop reason: HOSPADM

## 2017-01-18 RX ORDER — ONDANSETRON 2 MG/ML
4 INJECTION INTRAMUSCULAR; INTRAVENOUS AS NEEDED
Status: DISCONTINUED | OUTPATIENT
Start: 2017-01-18 | End: 2017-01-18 | Stop reason: HOSPADM

## 2017-01-18 RX ORDER — SODIUM CHLORIDE 0.9 % (FLUSH) 0.9 %
5-10 SYRINGE (ML) INJECTION EVERY 8 HOURS
Status: DISCONTINUED | OUTPATIENT
Start: 2017-01-18 | End: 2017-01-18 | Stop reason: HOSPADM

## 2017-01-18 RX ORDER — FENTANYL CITRATE 50 UG/ML
INJECTION, SOLUTION INTRAMUSCULAR; INTRAVENOUS AS NEEDED
Status: DISCONTINUED | OUTPATIENT
Start: 2017-01-18 | End: 2017-01-18 | Stop reason: HOSPADM

## 2017-01-18 RX ORDER — MIDAZOLAM HYDROCHLORIDE 1 MG/ML
0.5 INJECTION, SOLUTION INTRAMUSCULAR; INTRAVENOUS
Status: DISCONTINUED | OUTPATIENT
Start: 2017-01-18 | End: 2017-01-18 | Stop reason: HOSPADM

## 2017-01-18 RX ORDER — MIDAZOLAM HYDROCHLORIDE 1 MG/ML
INJECTION, SOLUTION INTRAMUSCULAR; INTRAVENOUS AS NEEDED
Status: DISCONTINUED | OUTPATIENT
Start: 2017-01-18 | End: 2017-01-18 | Stop reason: HOSPADM

## 2017-01-18 RX ORDER — SODIUM CHLORIDE 9 MG/ML
50 INJECTION, SOLUTION INTRAVENOUS CONTINUOUS
Status: DISCONTINUED | OUTPATIENT
Start: 2017-01-18 | End: 2017-01-18 | Stop reason: HOSPADM

## 2017-01-18 RX ORDER — IRBESARTAN 300 MG/1
300 TABLET ORAL DAILY
COMMUNITY

## 2017-01-18 RX ADMIN — INDOCYANINE GREEN AND WATER 5 MG: KIT at 10:46

## 2017-01-18 RX ADMIN — ROCURONIUM BROMIDE 5 MG: 10 INJECTION, SOLUTION INTRAVENOUS at 12:51

## 2017-01-18 RX ADMIN — HYDROMORPHONE HYDROCHLORIDE 0.2 MG: 1 INJECTION, SOLUTION INTRAMUSCULAR; INTRAVENOUS; SUBCUTANEOUS at 14:46

## 2017-01-18 RX ADMIN — PROPOFOL 130 MG: 10 INJECTION, EMULSION INTRAVENOUS at 11:20

## 2017-01-18 RX ADMIN — HYDROMORPHONE HYDROCHLORIDE 1 MG: 1 INJECTION, SOLUTION INTRAMUSCULAR; INTRAVENOUS; SUBCUTANEOUS at 22:41

## 2017-01-18 RX ADMIN — ROCURONIUM BROMIDE 30 MG: 10 INJECTION, SOLUTION INTRAVENOUS at 11:32

## 2017-01-18 RX ADMIN — ONDANSETRON 4 MG: 2 INJECTION INTRAMUSCULAR; INTRAVENOUS at 04:14

## 2017-01-18 RX ADMIN — Medication 10 ML: at 22:41

## 2017-01-18 RX ADMIN — DEXAMETHASONE SODIUM PHOSPHATE 10 MG: 4 INJECTION, SOLUTION INTRA-ARTICULAR; INTRALESIONAL; INTRAMUSCULAR; INTRAVENOUS; SOFT TISSUE at 11:31

## 2017-01-18 RX ADMIN — LIDOCAINE HYDROCHLORIDE 100 MG: 20 INJECTION, SOLUTION EPIDURAL; INFILTRATION; INTRACAUDAL; PERINEURAL at 11:20

## 2017-01-18 RX ADMIN — Medication 10 ML: at 00:33

## 2017-01-18 RX ADMIN — ONDANSETRON 4 MG: 2 INJECTION INTRAMUSCULAR; INTRAVENOUS at 22:41

## 2017-01-18 RX ADMIN — ROCURONIUM BROMIDE 5 MG: 10 INJECTION, SOLUTION INTRAVENOUS at 11:20

## 2017-01-18 RX ADMIN — ONDANSETRON 4 MG: 2 INJECTION INTRAMUSCULAR; INTRAVENOUS at 11:49

## 2017-01-18 RX ADMIN — Medication 10 ML: at 05:44

## 2017-01-18 RX ADMIN — FENTANYL CITRATE 100 MCG: 50 INJECTION, SOLUTION INTRAMUSCULAR; INTRAVENOUS at 11:20

## 2017-01-18 RX ADMIN — HYDROMORPHONE HYDROCHLORIDE 1 MG: 1 INJECTION, SOLUTION INTRAMUSCULAR; INTRAVENOUS; SUBCUTANEOUS at 16:46

## 2017-01-18 RX ADMIN — SUCRALFATE 1 G: 1 SUSPENSION ORAL at 22:00

## 2017-01-18 RX ADMIN — GLYCOPYRROLATE 0.3 MG: 0.2 INJECTION INTRAMUSCULAR; INTRAVENOUS at 14:04

## 2017-01-18 RX ADMIN — HYDROMORPHONE HYDROCHLORIDE 1 MG: 1 INJECTION, SOLUTION INTRAMUSCULAR; INTRAVENOUS; SUBCUTANEOUS at 08:44

## 2017-01-18 RX ADMIN — MIDAZOLAM HYDROCHLORIDE 2 MG: 1 INJECTION, SOLUTION INTRAMUSCULAR; INTRAVENOUS at 11:09

## 2017-01-18 RX ADMIN — FENTANYL CITRATE 25 MCG: 50 INJECTION, SOLUTION INTRAMUSCULAR; INTRAVENOUS at 15:38

## 2017-01-18 RX ADMIN — SODIUM CHLORIDE 40 MG: 9 INJECTION INTRAMUSCULAR; INTRAVENOUS; SUBCUTANEOUS at 22:42

## 2017-01-18 RX ADMIN — ONDANSETRON 4 MG: 2 INJECTION INTRAMUSCULAR; INTRAVENOUS at 08:42

## 2017-01-18 RX ADMIN — HYDROMORPHONE HYDROCHLORIDE 1 MG: 1 INJECTION, SOLUTION INTRAMUSCULAR; INTRAVENOUS; SUBCUTANEOUS at 00:33

## 2017-01-18 RX ADMIN — CEFAZOLIN 2 G: 10 INJECTION, POWDER, FOR SOLUTION INTRAVENOUS; PARENTERAL at 11:27

## 2017-01-18 RX ADMIN — SODIUM CHLORIDE 100 ML/HR: 900 INJECTION, SOLUTION INTRAVENOUS at 14:54

## 2017-01-18 RX ADMIN — SUCCINYLCHOLINE CHLORIDE 20 MG: 20 INJECTION INTRAMUSCULAR; INTRAVENOUS at 11:28

## 2017-01-18 RX ADMIN — HYDROMORPHONE HYDROCHLORIDE 1 MG: 1 INJECTION, SOLUTION INTRAMUSCULAR; INTRAVENOUS; SUBCUTANEOUS at 05:43

## 2017-01-18 RX ADMIN — FENTANYL CITRATE 25 MCG: 50 INJECTION, SOLUTION INTRAMUSCULAR; INTRAVENOUS at 11:36

## 2017-01-18 RX ADMIN — SODIUM CHLORIDE 40 MG: 9 INJECTION INTRAMUSCULAR; INTRAVENOUS; SUBCUTANEOUS at 08:42

## 2017-01-18 RX ADMIN — SUCCINYLCHOLINE CHLORIDE 100 MG: 20 INJECTION INTRAMUSCULAR; INTRAVENOUS at 11:20

## 2017-01-18 RX ADMIN — NEOSTIGMINE METHYLSULFATE 1.5 MG: 1 INJECTION INTRAVENOUS at 14:04

## 2017-01-18 RX ADMIN — GABAPENTIN 100 MG: 100 CAPSULE ORAL at 22:41

## 2017-01-18 RX ADMIN — SODIUM CHLORIDE 100 ML/HR: 900 INJECTION, SOLUTION INTRAVENOUS at 02:15

## 2017-01-18 RX ADMIN — GABAPENTIN 100 MG: 100 CAPSULE ORAL at 08:42

## 2017-01-18 RX ADMIN — INDOMETHACIN 100 MG: 50 SUPPOSITORY RECTAL at 15:30

## 2017-01-18 RX ADMIN — SODIUM CHLORIDE, SODIUM LACTATE, POTASSIUM CHLORIDE, AND CALCIUM CHLORIDE 75 ML/HR: 600; 310; 30; 20 INJECTION, SOLUTION INTRAVENOUS at 10:34

## 2017-01-18 NOTE — PERIOP NOTES
1300:  Dr.s Catherine Marquis in to see the patient's  & discuss ERCP procedure while patient is in the OR.  Kushal Bradymarleny did give consent for the extra procedure.

## 2017-01-18 NOTE — ANESTHESIA POSTPROCEDURE EVALUATION
Post-Anesthesia Evaluation and Assessment    Patient: Caren Shanks MRN: [id number]  SSN: [id number]    YOB: 1955  Age: 62 y.o. Sex: female       Cardiovascular Function/Vital Signs  Visit Vitals    /71    Pulse (!) 56    Temp 37.2 °C (98.9 °F)    Resp 14    Ht 5' 7\" (1.702 m)    Wt 82.5 kg (181 lb 14.1 oz)    SpO2 100%    BMI 28.49 kg/m2       Patient is status post general anesthesia for Procedure(s):  CHOLECYSTECTOMY ROBOTIC ASSISTED WITH INTRAOPERATIVE CHOLANGIOGRAM  ENDOSCOPIC RETROGRADE CHOLANGIOPANCREATOGRAPHY,SPHINCTEROTOMY, BALLOON SWEEP STONE EXTRACTION. Nausea/Vomiting: None    Postoperative hydration reviewed and adequate. Pain:  Pain Scale 1: Numeric (0 - 10) (01/18/17 1458)  Pain Intensity 1: 2 (01/18/17 1458)   Managed    Neurological Status:   Neuro (WDL): Within Defined Limits (01/18/17 1421)  Neuro  Neurologic State: Drowsy; Eyes open to stimulus (01/18/17 1421)  Orientation Level: Oriented to person (01/18/17 1421)  Cognition: Follows commands (01/18/17 1421)  Speech: Clear (01/18/17 1421)  LUE Motor Response: Purposeful (01/18/17 1421)  LLE Motor Response: Purposeful (01/18/17 1421)  RUE Motor Response: Purposeful (01/18/17 1421)  RLE Motor Response: Purposeful (01/18/17 1421)   At baseline    Mental Status and Level of Consciousness: Arousable    Pulmonary Status:   O2 Device: Nasal cannula (01/18/17 1421)   Adequate oxygenation and airway patent    Complications related to anesthesia: None    Post-anesthesia assessment completed.  No concerns    Signed By: Michela Paula MD     January 18, 2017

## 2017-01-18 NOTE — PERIOP NOTES
TRANSFER - OUT REPORT:    Verbal report given to Gregorio Hunt RN on Oscar Shin  being transferred to [facility](unit) for routine post - op       Report consisted of patients Situation, Background, Assessment and   Recommendations(SBAR). Information from the following report(s) SBAR, Kardex, OR Summary, Intake/Output and MAR was reviewed with the receiving nurse. Opportunity for questions and clarification was provided.       Patient transported with:   O2 @ 2 liters  Tech

## 2017-01-18 NOTE — ANESTHESIA PREPROCEDURE EVALUATION
Anesthetic History   No history of anesthetic complications            Review of Systems / Medical History  Patient summary reviewed, nursing notes reviewed and pertinent labs reviewed    Pulmonary  Within defined limits                 Neuro/Psych             Comments: fibromyalgia Cardiovascular    Hypertension              Exercise tolerance: >4 METS     GI/Hepatic/Renal     GERD: well controlled           Endo/Other  Within defined limits           Other Findings              Physical Exam    Airway  Mallampati: II  TM Distance: 4 - 6 cm  Neck ROM: normal range of motion   Mouth opening: Normal     Cardiovascular  Regular rate and rhythm,  S1 and S2 normal,  no murmur, click, rub, or gallop  Rhythm: regular  Rate: normal         Dental  No notable dental hx       Pulmonary  Breath sounds clear to auscultation               Abdominal  GI exam deferred       Other Findings            Anesthetic Plan    ASA: 2  Anesthesia type: general          Induction: Intravenous  Anesthetic plan and risks discussed with: Patient

## 2017-01-18 NOTE — PERIOP NOTES
Handoff Report from Operating Room to PACU    Report received from LILLIANA Pillai RN  and Alison Tobar CRNA regarding Elizabeth Roy. Surgeon(s):  Najma Boles.MD Eligio MD  And Procedure(s) (LRB):  CHOLECYSTECTOMY ROBOTIC ASSISTED WITH INTRAOPERATIVE CHOLANGIOGRAM (N/A)  ENDOSCOPIC RETROGRADE CHOLANGIOPANCREATOGRAPHY,SPHINCTEROTOMY, BALLOON SWEEP STONE EXTRACTION (N/A)  confirmed   with allergies and dressings discussed. Anesthesia type, drugs, patient history, complications, estimated blood loss, vital signs, intake and output, and last pain medication and reversal medications were reviewed.

## 2017-01-18 NOTE — PERIOP NOTES
TRANSFER - IN REPORT:    Verbal report received from [place], RN(name) on Ethan Wilcox  being received from [facility]252(unit) for ordered procedure      Report consisted of patients Situation, Background, Assessment and   Recommendations(SBAR). Information from the following report(s) SBAR, Kardex, Intake/Output and MAR was reviewed with the receiving nurse. Opportunity for questions and clarification was provided. [place], RN to check POC FSBS due to glucose 62 on am labs. Also to do EKG and CHG wipes.

## 2017-01-18 NOTE — PROGRESS NOTES
Bedside and Verbal shift change report given to Riya Chandra rn (oncoming nurse) by Rhonda Munoz (offgoing nurse). Report included the following information SBAR, Procedure Summary, Intake/Output, MAR and Recent Results.

## 2017-01-18 NOTE — OP NOTES
[facility] OPERATIVE REPORT    1/18/2017    Pre-operative Diagnosis: gallstones    Post-operative Diagnosis: gallstones, chronic cholecystitis, choledocholithiasis    OPERATIVE PROCEDURE:   Laparoscopic cholecystectomy, robot assisted. Surgeon: Lisa Fang MD FACS    Anesthesia: General plus Local    Estimated Blood Loss: Minimal    FINDINGS:   The patient was noted to have a gallbladder with thickened wall, stones, sludge and bile. Intraoperative cholangiogram was obtained. There was filling of a normal length cystic duct, a mildly dilated common bile duct, and all proximal and distal structures. There is a filling defect in the distal duct without flow of contrast into the duodenum. This is c/w a common bile duct stone. The liver otherwise appeared normal, and the remaining abdominal cavity appeared normal.    SPECIMEN:    ID Type Source Tests Collected by Time Destination   1 : gallbladder Preservative Gallbladder  Lisa Fang MD 1/18/2017 1254 Pathology        DRAINS:  None. COMPLICATIONS:  None. DESCRIPTION OF PROCEDURE: After satisfactory induction of general  anesthesia, the patient was kept in the supine position. Her bilateral arms  were kept at 90 degrees with appropriate padding. The patient's abdomen was  prepped and draped sterilely. After infusing a local anesthetic, a small  incision was made in the left abdomen and an 8 Optical entry trocar was placed  intra-abdominally under visualization and pneumoperitoneum was achieved. The abdomen was explored with findings noted above. Three additional 8-mm port were placed  in the patient's abdomen. The patient was placed in reverse  Trendelenburg and rotated towards the left and the robot was docked and robotic instruments inserted. The gallbladder was distended. It was retracted superior and laterally. The reflection of  peritoneum was brought down, exposing Calot triangle.  Cystic duct and  cystic artery were both identified,  surrounding tissues, and the  junction of the cystic duct and common bile duct was identified obtaining  the critical view. ICG view noted a bright liver and faint ducts, c/w ductal obstruction. A small ductotomy was created through which intraoperative cholangiogram was obtained with findings noted above. The cholangiogram catheter was removed, 2 clips placed just distal to the ductotomy on the cystic duct, and the duct was transected. Next, the cystic artery was identified,  from surrounding tissues. A single clip was placed proximally. The gallbladder was  from its bed in the liver using electrocautery. Once completed, the bed was noted to be hemostatic and the clips were identified. 10 ml of Tisseel used to ensure hemostasis. There was no extravasation of blood or bile. Final irrigation was noted to run clear. The gallbladder was retrieved through the right port site using an Endopouch. The portswere removed sequentially on visualization. Pneumoperitoneum was allowed to  escape. A figure-of-eight 0 Vicryl suture was used to close the right port fascial defect, followed by closure of all skin incisions with subcuticular Monocryl and Dermabond. Intraoperative consult was obtained from Dr. Harini Matthews. Prior to surgery I discussed with [facility] and family regarding the need for [facility] [place] and possible need for post-op ERCP due to elevated milka this am.   Dr. Harini Matthews has spoken with family and arranged his schedule to allow immediate ERCP. Dr. Chata Murphy portion of the case dictated separately.

## 2017-01-18 NOTE — PROCEDURES
Terrell Gongora    NAME:  Taniya Mart   :   1955   MRN:   [id number]     Date/Time:  2017 2:11 PM    Procedure Type:   ERCP with sphincterotomy and stone extraction    Indications: common bile duct stone found on IOC  Pre-operative Diagnosis: see indication above  Post-operative Diagnosis:  See findings below  : Charline Prado MD    Referring Provider:    Sebastian Miner MD, Christie Harris MD    Exam:  Airway: clear, no airway problems anticipated  Heart: RRR, without gallops or rubs  Lungs: clear bilaterally without wheezes, crackles, or rhonchi  Abdomen: soft, nontender, nondistended, bowel sounds present  Mental Status: awake, alert and oriented to person, place and time    Sedation:  General anesthesia  Procedure Details:  After informed consent was obtained with all risks and benefits of procedure explained, the patient was taken to the fluoroscopy suite and placed in the prone position. Upon sequential sedation as per above, the Olympus duodenoscope was inserted via the mouthpeice and carefully advanced to the second portion of the duodenum. The quality of visualization was good. The duodenoscope was withdrawn into a short position. Findings:   Endoscopic: -normal esophagus, stomach, and duodenum  Ampulla:impacted stone  Cholangiogram: -Dilated cbd with stone at level of ampulla  Pancreatogram:not performed    Specimen Removed:  None    Complications: None. EBL:  None. Interventions:    Pancreatic: none  Biliary: Biliary sphinctetomy performed and single yellow stone removed    Impression:  Choledocholithiasis---removed after biliary sphincterotomy    Recommendations:  Clear liquid diet and advance as tolerated. Resume normal medication(s).        Charline Prado MD

## 2017-01-18 NOTE — PROGRESS NOTES
2340: Bedside and Verbal shift change report given to Daniel Rose Mildred (oncoming nurse) by Luana Martin (offgoing nurse). Report included the following information SBAR.     0000: NPO per order. Consent in chart per offgoing RN.    0100: [facility] Downtime to occur at 0100.    0415: pst and lav drawn. 0500: [facility] back up.

## 2017-01-19 LAB
ALBUMIN SERPL BCP-MCNC: 3 G/DL (ref 3.5–5)
ALBUMIN/GLOB SERPL: 0.9 {RATIO} (ref 1.1–2.2)
ALP SERPL-CCNC: 288 U/L (ref 45–117)
ALT SERPL-CCNC: 323 U/L (ref 12–78)
ANION GAP BLD CALC-SCNC: 12 MMOL/L (ref 5–15)
AST SERPL W P-5'-P-CCNC: 175 U/L (ref 15–37)
BILIRUB SERPL-MCNC: 1.4 MG/DL (ref 0.2–1)
BUN SERPL-MCNC: 13 MG/DL (ref 6–20)
BUN/CREAT SERPL: 14 (ref 12–20)
CALCIUM SERPL-MCNC: 8.2 MG/DL (ref 8.5–10.1)
CHLORIDE SERPL-SCNC: 106 MMOL/L (ref 97–108)
CO2 SERPL-SCNC: 23 MMOL/L (ref 21–32)
CREAT SERPL-MCNC: 0.91 MG/DL (ref 0.55–1.02)
ERYTHROCYTE [DISTWIDTH] IN BLOOD BY AUTOMATED COUNT: 12.4 % (ref 11.5–14.5)
GLOBULIN SER CALC-MCNC: 3.5 G/DL (ref 2–4)
GLUCOSE SERPL-MCNC: 95 MG/DL (ref 65–100)
HCT VFR BLD AUTO: 32.2 % (ref 35–47)
HGB BLD-MCNC: 11 G/DL (ref 11.5–16)
LIPASE SERPL-CCNC: 80 U/L (ref 73–393)
MCH RBC QN AUTO: 29.5 PG (ref 26–34)
MCHC RBC AUTO-ENTMCNC: 34.2 G/DL (ref 30–36.5)
MCV RBC AUTO: 86.3 FL (ref 80–99)
PLATELET # BLD AUTO: 173 K/UL (ref 150–400)
POTASSIUM SERPL-SCNC: 4.3 MMOL/L (ref 3.5–5.1)
PROT SERPL-MCNC: 6.5 G/DL (ref 6.4–8.2)
RBC # BLD AUTO: 3.73 M/UL (ref 3.8–5.2)
SODIUM SERPL-SCNC: 141 MMOL/L (ref 136–145)
WBC # BLD AUTO: 8.6 K/UL (ref 3.6–11)

## 2017-01-19 PROCEDURE — 74011250636 HC RX REV CODE- 250/636: Performed by: INTERNAL MEDICINE

## 2017-01-19 PROCEDURE — 74011250637 HC RX REV CODE- 250/637: Performed by: INTERNAL MEDICINE

## 2017-01-19 PROCEDURE — 65270000029 HC RM PRIVATE

## 2017-01-19 PROCEDURE — 36415 COLL VENOUS BLD VENIPUNCTURE: CPT | Performed by: SURGERY

## 2017-01-19 PROCEDURE — 74011000250 HC RX REV CODE- 250: Performed by: INTERNAL MEDICINE

## 2017-01-19 PROCEDURE — 83690 ASSAY OF LIPASE: CPT | Performed by: SURGERY

## 2017-01-19 PROCEDURE — 77010033678 HC OXYGEN DAILY

## 2017-01-19 PROCEDURE — 85027 COMPLETE CBC AUTOMATED: CPT | Performed by: SURGERY

## 2017-01-19 PROCEDURE — 74011250636 HC RX REV CODE- 250/636: Performed by: SURGERY

## 2017-01-19 PROCEDURE — 80053 COMPREHEN METABOLIC PANEL: CPT | Performed by: SURGERY

## 2017-01-19 PROCEDURE — C9113 INJ PANTOPRAZOLE SODIUM, VIA: HCPCS | Performed by: INTERNAL MEDICINE

## 2017-01-19 RX ORDER — PANTOPRAZOLE SODIUM 40 MG/1
40 TABLET, DELAYED RELEASE ORAL
Status: DISCONTINUED | OUTPATIENT
Start: 2017-01-20 | End: 2017-01-20 | Stop reason: HOSPADM

## 2017-01-19 RX ORDER — OXYCODONE AND ACETAMINOPHEN 5; 325 MG/1; MG/1
1-2 TABLET ORAL
Status: DISCONTINUED | OUTPATIENT
Start: 2017-01-19 | End: 2017-01-20 | Stop reason: HOSPADM

## 2017-01-19 RX ORDER — HYDROMORPHONE HYDROCHLORIDE 1 MG/ML
0.5 INJECTION, SOLUTION INTRAMUSCULAR; INTRAVENOUS; SUBCUTANEOUS
Status: DISCONTINUED | OUTPATIENT
Start: 2017-01-19 | End: 2017-01-20 | Stop reason: HOSPADM

## 2017-01-19 RX ADMIN — SODIUM CHLORIDE 100 ML/HR: 900 INJECTION, SOLUTION INTRAVENOUS at 02:33

## 2017-01-19 RX ADMIN — GABAPENTIN 100 MG: 100 CAPSULE ORAL at 23:07

## 2017-01-19 RX ADMIN — GABAPENTIN 100 MG: 100 CAPSULE ORAL at 17:29

## 2017-01-19 RX ADMIN — SUCRALFATE 1 G: 1 SUSPENSION ORAL at 13:07

## 2017-01-19 RX ADMIN — HEPARIN SODIUM 5000 UNITS: 5000 INJECTION, SOLUTION INTRAVENOUS; SUBCUTANEOUS at 19:54

## 2017-01-19 RX ADMIN — SUCRALFATE 1 G: 1 SUSPENSION ORAL at 23:07

## 2017-01-19 RX ADMIN — SUCRALFATE 1 G: 1 SUSPENSION ORAL at 08:27

## 2017-01-19 RX ADMIN — SUCRALFATE 1 G: 1 SUSPENSION ORAL at 17:29

## 2017-01-19 RX ADMIN — HYDROMORPHONE HYDROCHLORIDE 1 MG: 1 INJECTION, SOLUTION INTRAMUSCULAR; INTRAVENOUS; SUBCUTANEOUS at 03:45

## 2017-01-19 RX ADMIN — HEPARIN SODIUM 5000 UNITS: 5000 INJECTION, SOLUTION INTRAVENOUS; SUBCUTANEOUS at 13:07

## 2017-01-19 RX ADMIN — HYDROMORPHONE HYDROCHLORIDE 0.5 MG: 1 INJECTION, SOLUTION INTRAMUSCULAR; INTRAVENOUS; SUBCUTANEOUS at 17:30

## 2017-01-19 RX ADMIN — HYDROMORPHONE HYDROCHLORIDE 1 MG: 1 INJECTION, SOLUTION INTRAMUSCULAR; INTRAVENOUS; SUBCUTANEOUS at 13:07

## 2017-01-19 RX ADMIN — GABAPENTIN 100 MG: 100 CAPSULE ORAL at 08:27

## 2017-01-19 RX ADMIN — HYDROMORPHONE HYDROCHLORIDE 1 MG: 1 INJECTION, SOLUTION INTRAMUSCULAR; INTRAVENOUS; SUBCUTANEOUS at 08:27

## 2017-01-19 RX ADMIN — HEPARIN SODIUM 5000 UNITS: 5000 INJECTION, SOLUTION INTRAVENOUS; SUBCUTANEOUS at 04:55

## 2017-01-19 RX ADMIN — ONDANSETRON 4 MG: 2 INJECTION INTRAMUSCULAR; INTRAVENOUS at 08:27

## 2017-01-19 RX ADMIN — OXYCODONE HYDROCHLORIDE AND ACETAMINOPHEN 2 TABLET: 5; 325 TABLET ORAL at 19:52

## 2017-01-19 RX ADMIN — SODIUM CHLORIDE 40 MG: 9 INJECTION INTRAMUSCULAR; INTRAVENOUS; SUBCUTANEOUS at 08:27

## 2017-01-19 NOTE — PROGRESS NOTES
Pollo [facility] La Cruz      NAME: Luis Silva   :  1955  MRM:  [id number]    Date/Time: 2017  12:44 PM        Hospitalist Progress Note        Assessment / Plan:  Cholelithiasis POA s/p cholecystectomy 2017  Choledocholithiasis POA s/p ERCP and sphincterotomy 2017  Severe epigastric pain: POA  EGD was normal  Appreciate GI consult  Work up eventually lead to diagnosis, had increasing LFTs yesterday T bili 2.5  OR for cholecystectomy and E RCP to remove impacted distal stone 2017  Clinically improving, plan for D/C in Am if remains stable    Acute renal failure likely pre renal from volume depletion from N/V  Admit BUN/creat 25/2.18, prior baseline 0.8 to 0.9  IVF, resolved and creatinine back to baseline    Intractable nausea and Vomiting POA  Due to cholelithiasis  Resolved post op, diet being advanced  Avoid nephrotoxic drugs    Essential HTN POA  BP reasonably controlled off meds, watch closely     Chronic pain: POA  Resume home meds    Code status: FULL   VTE PROPHYLAXIS:heparin               Subjective:     Chief Complaint:  Mild sore in abdomen in RUQ \" from the surgery\", controlled with current pain meds  No N/V, diet being advanced and she is tolerating  No Cp or SOB      ROS:  Constitutional: negative for fevers  Respiratory: negative for cough,SOB  Cardiovascular: negative for CP or orthopnea  Gastrointestinal: no N/V, positive Mild abdominal pain, no iarrhea  Genitourinary:negative for frequency and dysuria   Endocrine: Denies heat or cold intolerance       Objective:     Last 24hrs VS reviewed since prior progress note.  Most recent are:    Visit Vitals    /69 (BP 1 Location: Left arm, BP Patient Position: At rest;Sitting)    Pulse 65    Temp 98.1 °F (36.7 °C)    Resp 18    Ht 5' 7\" (1.702 m)    Wt 82.5 kg (181 lb 14.1 oz)    SpO2 95%    BMI 28.49 kg/m2     SpO2 Readings from Last 6 Encounters:   17 95% 02/18/16 100%   04/15/13 97%    O2 Flow Rate (L/min): 2 l/min       Intake/Output Summary (Last 24 hours) at 01/19/17 1716  Last data filed at 01/19/17 0501   Gross per 24 hour   Intake          2276.66 ml   Output             1200 ml   Net          1076.66 ml          Exam:     Physical Exam:    Gen:  Well-developed, well-nourished, in no acute distress  HEENT:  hearing intact to voice, moist mucous membranes  Resp:  No accessory muscle use, clear breath sounds without wheezes rales or rhonchi  Card:  No murmurs, normal S1, S2 without thrills, bruits or peripheral edema  Abd:  Soft, mildly tender RUQ, non-distended, normoactive bowel sounds are present  Musc:  No cyanosis or clubbing  Skin:  No rashes, skin turgor is good  Neuro:  Cranial nerves 3-12 are grossly intact  Psych:  Good insight, oriented to person, place and time, alert    Medications Reviewed: (see below)    Lab Data Reviewed: (see below)    ______________________________________________________________________    Medications:     Current Facility-Administered Medications   Medication Dose Route Frequency    oxyCODONE-acetaminophen (PERCOCET) 5-325 mg per tablet 1-2 Tab  1-2 Tab Oral Q6H PRN    HYDROmorphone (PF) (DILAUDID) injection 0.5 mg  0.5 mg IntraVENous Q3H PRN    [START ON 1/20/2017] pantoprazole (PROTONIX) tablet 40 mg  40 mg Oral ACB    simethicone (MYLICON) tablet 80 mg  80 mg Oral QID PRN    sucralfate (CARAFATE) 100 mg/mL oral suspension 1 g  1 g Oral QID    sodium chloride (NS) flush 5-10 mL  5-10 mL IntraVENous PRN    ondansetron (ZOFRAN) injection 4 mg  4 mg IntraVENous Q6H PRN    heparin (porcine) injection 5,000 Units  5,000 Units SubCUTAneous Q8H    gabapentin (NEURONTIN) capsule 100 mg  100 mg Oral TID    hydrALAZINE (APRESOLINE) 20 mg/mL injection 10 mg  10 mg IntraVENous Q6H PRN            Lab Review:     Recent Labs      01/19/17   0349  01/18/17   0420  01/17/17   1729   WBC  8.6  6.7  6.5   HGB  11.0*  11.7  11.9 HCT  32.2*  34.3*  34.7*   PLT  173  177  169     Recent Labs      01/19/17   0349  01/18/17   0420   NA  141  141   K  4.3  3.5   CL  106  105   CO2  23  22   GLU  95  62*   BUN  13  10   CREA  0.91  0.91   CA  8.2*  8.3*   ALB  3.0*  3.5   SGOT  175*  190*   ALT  323*  379*     No components found for: [place]        Total time spent with patient: 20 Minutes                  Care Plan discussed with: Patient    Discussed:  Care Plan    Prophylaxis:  Lovenox    Disposition:  Home w/Family           ___________________________________________________    Attending Physician: Regulo Stevenson MD

## 2017-01-19 NOTE — PROGRESS NOTES
Bedside shift change report given to Frances King RN (oncoming nurse) by Eleuterio Tejeda RN (offgoing nurse). Report included the following information SBAR, Kardex and Cardiac Rhythm . Chelsy Castillo     Zone Phone for oncoming shift:   [phone]    Shift Summary: n/a    LDAs               Peripheral IV 01/13/17 Right Antecubital (Active)   Site Assessment Clean, dry, & intact 1/19/2017  3:30 PM   Phlebitis Assessment 0 1/19/2017  3:30 PM   Infiltration Assessment 0 1/19/2017  3:30 PM   Dressing Status Clean, dry, & intact 1/19/2017  3:30 PM   Dressing Type Tape;Transparent 1/19/2017  3:30 PM   Hub Color/Line Status Pink;Capped 1/19/2017  3:30 PM   Action Taken Open ports on tubing capped 1/17/2017  2:41 PM   Alcohol Cap Used Yes 1/19/2017  3:30 PM                        Intake & Output   Date 01/18/17 0700 - 01/19/17 0659 01/19/17 0700 - 01/20/17 0659   Shift 4926-6679 4326-7603 24 Hour Total 2006-7587 9500-3588 24 Hour Total   I  N  T  A  K  E   I.V.  (mL/kg/hr) 900  (0.9) 2276.7  (2.3) 3176.7  (1.6)         Volume (0.9% sodium chloride infusion)  2276.7 2276.7         Volume (lactated ringers infusion) 900  900       Shift Total  (mL/kg) 900  (10.9) 2276.7  (27.6) 3176.7  (38.5)      O  U  T  P  U  T   Urine  (mL/kg/hr)  1200  (1.2) 1200  (0.6)         Urine  1200 1200       Blood 25  25         Estimated Blood Loss 25  25       Shift Total  (mL/kg) 25  (0.3) 1200  (14.5) 1225  (14.8)       1076.7 1951.7      Weight (kg) 82.5 82.5 82.5 82.5 82.5 82.5      Last Bowel Movement Last Bowel Movement Date: 01/17/17   Glucose Checks [x] N/A  [] AC/HS  [] Q6  Concerns:   Nutrition Active Orders   Diet    DIET GI LITE (POST SURGICAL)       Consults []PT  []OT  []Speech  []Case Management   Cardiac Monitoring []N/A [x]Yes Expires:

## 2017-01-19 NOTE — PROGRESS NOTES
Pt admitted with UTI. [place], has [facility]o, full code, PCP is Dr Avtar Crum, went to [facility] on Tuesday, is independent with adls, uses no dme, works, and anticipates d/c to home when stable. 09  Assisted pt from BR to chair. She voided large amount concentrated urine in commode.   No c/o pain.

## 2017-01-19 NOTE — PROGRESS NOTES
Nutrition Services      Nutrition Screen:  Wt Readings from Last 10 Encounters:   01/13/17 82.5 kg (181 lb 14.1 oz)   02/18/16 91.6 kg (202 lb)   04/14/13 92.9 kg (204 lb 12.9 oz)     Body mass index is 28.49 kg/(m^2). Diet review done w/pt & father; both expressed interest & understanding. Pt was already following a low- fat diet pta. Provided written copy & contact info. Supplements:                        _____ ordered ______  declined. __ __  Pt is nutritionally stable at this time, will rescreen in 7 days. _x __    Pt is at nutritional risk and will be rescreened in 2-5 days. __ __  Pt is at moderate or high nutritional risk, will refer to RD for assessment.        Vin Samayoa  Dietetic Technician, Registered

## 2017-01-19 NOTE — PROGRESS NOTES
Patient complained of not being able to void and was not successful voiding on the bedpan. Spoke with Dr. Yuli Zapata, was given a verbal one time order to straight cath if patient gets too uncomfortable and not able to void. Bladder scanned patient and 877 mL of urine was in bladder. Straight cath and 1200+ mL emptied out of bladder. Patient did not call or ask for bedpan nor to use the restroom the rest of the night. Bedside and Verbal shift change report given to [facility] [address] (oncoming nurse) by Korene Burkitt RN (offgoing nurse). Report included the following information SBAR, Kardex, Intake/Output and MAR. Zone Phone for oncoming shift:   [phone]    Shift Summary: See note above    LDAs               Peripheral IV 01/13/17 Right Antecubital (Active)   Site Assessment Clean, dry, & intact 1/19/2017  3:53 AM   Phlebitis Assessment 0 1/19/2017  3:53 AM   Infiltration Assessment 0 1/19/2017  3:53 AM   Dressing Status Clean, dry, & intact 1/19/2017  3:53 AM   Dressing Type Transparent;Tape 1/19/2017  3:53 AM   Hub Color/Line Status Pink; Infusing 1/19/2017  3:53 AM   Action Taken Open ports on tubing capped 1/17/2017  2:41 PM   Alcohol Cap Used Yes 1/17/2017  2:41 PM                        Intake & Output   Date 01/18/17 0700 - 01/19/17 0659 01/19/17 0700 - 01/20/17 0659   Shift 0328-8496 7623-6945 24 Hour Total 5813-4710 2049-9432 24 Hour Total   I  N  T  A  K  E   I.V.  (mL/kg/hr) 900  (0.9) 2276.7  (2.3) 3176.7  (1.6)         Volume (0.9% sodium chloride infusion)  2276.7 2276.7         Volume (lactated ringers infusion) 900  900       Shift Total  (mL/kg) 900  (10.9) 2276.7  (27.6) 3176.7  (38.5)      O  U  T  P  U  T   Urine  (mL/kg/hr)  1200  (1.2) 1200  (0.6)         Urine  1200 1200       Blood 25  25         Estimated Blood Loss 25  25       Shift Total  (mL/kg) 25  (0.3) 1200  (14.5) 1225  (14.8)       1076.7 1951.7      Weight (kg) 82.5 82.5 82.5 82.5 82.5 82.5      Last Bowel Movement Last Bowel Movement Date: 01/17/17   Glucose Checks [x] N/A  [] AC/HS  [] Q6  Concerns:   Nutrition Active Orders   Diet    DIET CLEAR LIQUID       Consults [x]PT  [x]OT  []Speech  [x]Case Management   Cardiac Monitoring [x]N/A []Yes Expires:

## 2017-01-19 NOTE — PROGRESS NOTES
Admit Date: 2017  POD 1 Day Post-Op  Status/Post:  Procedure(s):  CHOLECYSTECTOMY ROBOTIC ASSISTED WITH INTRAOPERATIVE CHOLANGIOGRAM  ENDOSCOPIC RETROGRADE CHOLANGIOPANCREATOGRAPHY,SPHINCTEROTOMY, BALLOON SWEEP STONE EXTRACTION    Assessment:     Active Problems:    UTI (urinary tract infection) (2017)        Plan/Recommendations/Medical Decision Making:     Feeling better  Pain better controlled  thomas Clears    Adv diet  Home tomorrow if tolerates. -------------------------------------------------------------------------------------------------------------------      Subjective:     Patient has no new complaints. no nausea      Objective:     Blood pressure 160/75, pulse (!) 56, temperature 98.1 °F (36.7 °C), resp. rate 18, height 5' 7\" (1.702 m), weight 82.5 kg (181 lb 14.1 oz), SpO2 98 %. Temp (24hrs), Av.2 °F (36.8 °C), Min:97.6 °F (36.4 °C), Max:98.9 °F (37.2 °C)      Physical Exam:   Abdominal exam: soft  non-distended  appropriatly tender.   Wound: clean, dry, no drainage    Labs:   Recent Results (from the past 24 hour(s))   CBC W/O DIFF    Collection Time: 17  3:49 AM   Result Value Ref Range    WBC 8.6 3.6 - 11.0 K/uL    RBC 3.73 (L) 3.80 - 5.20 M/uL    HGB 11.0 (L) 11.5 - 16.0 g/dL    HCT 32.2 (L) 35.0 - 47.0 %    MCV 86.3 80.0 - 99.0 FL    MCH 29.5 26.0 - 34.0 PG    MCHC 34.2 30.0 - 36.5 g/dL    RDW 12.4 11.5 - 14.5 %    PLATELET 910 953 - 944 K/uL   METABOLIC PANEL, COMPREHENSIVE    Collection Time: 17  3:49 AM   Result Value Ref Range    Sodium 141 136 - 145 mmol/L    Potassium 4.3 3.5 - 5.1 mmol/L    Chloride 106 97 - 108 mmol/L    CO2 23 21 - 32 mmol/L    Anion gap 12 5 - 15 mmol/L    Glucose 95 65 - 100 mg/dL    BUN 13 6 - 20 MG/DL    Creatinine 0.91 0.55 - 1.02 MG/DL    BUN/Creatinine ratio 14 12 - 20      GFR est AA >60 >60 ml/min/1.73m2    GFR est non-AA >60 >60 ml/min/1.73m2    Calcium 8.2 (L) 8.5 - 10.1 MG/DL    Bilirubin, total 1.4 (H) 0.2 - 1.0 MG/DL     (H) 12 - 78 U/L     (H) 15 - 37 U/L    Alk.  phosphatase 288 (H) 45 - 117 U/L    Protein, total 6.5 6.4 - 8.2 g/dL    Albumin 3.0 (L) 3.5 - 5.0 g/dL    Globulin 3.5 2.0 - 4.0 g/dL    A-G Ratio 0.9 (L) 1.1 - 2.2     LIPASE    Collection Time: 01/19/17  3:49 AM   Result Value Ref Range    Lipase 80 73 - 393 U/L       Data Review

## 2017-01-19 NOTE — PROGRESS NOTES
[facility]      NAME: Orlando Cabral   :  1955  MRM:  [id number]    Date/Time: 2017  12:44 PM      Chen Gupta MD  Hospitalist Progress Note           Assessment / Plan:  Cholelithiasis POA  Choledocholithiasis POA  Severe epigastric pain: POA  EGD was normal  Biopsies pending  PPI BID  Appreciate GI consult  OR for cholecystectomy and E RCP to remove impacted distal stone    Intractable nausea and Vomiting POA  Vomiting resolved, c/o nausea again this morning  Acute renal failure likely pre renal from volume depletion-resolved   INF  Avoid nephrotoxic drugs  Dose meds as per renal function    Essential HTN POA  BP reasonably controlled off meds, watch closely     Chronic pain: POA  Resume home meds    Code status: FULL   VTE PROPHYLAXIS:heparin               Subjective:     Chief Complaint:  Seen post-op, had GB out and had an ERCP with an impacted stone  Currently complains about not being able to urinate      ROS:  Constitutional: negative for fevers  Respiratory: negative for cough,SOB  Cardiovascular: negative for CP or orthopnea  Gastrointestinal: no N/V or abdominal pain or diarrhea  Genitourinary:negative for frequency and dysuria   Endocrine: Denies heat or cold intolerance       Objective:     Vitals:          Last 24hrs VS reviewed since prior progress note.  Most recent are:    Visit Vitals    /83 (BP 1 Location: Left arm, BP Patient Position: At rest)    Pulse (!) 51    Temp 97.9 °F (36.6 °C)    Resp 16    Ht 5' 7\" (1.702 m)    Wt 82.5 kg (181 lb 14.1 oz)    SpO2 99%    BMI 28.49 kg/m2     SpO2 Readings from Last 6 Encounters:   17 99%   16 100%   04/15/13 97%    O2 Flow Rate (L/min): 2 l/min       Intake/Output Summary (Last 24 hours) at 17 2125  Last data filed at 17 1420   Gross per 24 hour   Intake          2358.33 ml   Output               25 ml   Net          2333.33 ml          Exam: Physical Exam:    Gen:  Well-developed, well-nourished, in no acute distress  HEENT:  Pink conjunctivae, PERRL, hearing intact to voice, moist mucous membranes  Neck:  Supple, without masses, thyroid non-tender  Resp:  No accessory muscle use, clear breath sounds without wheezes rales or rhonchi  Card:  No murmurs, normal S1, S2 without thrills, bruits or peripheral edema  Abd:  Soft, non-tender, non-distended, normoactive bowel sounds are present  Musc:  No cyanosis or clubbing  Skin:  No rashes or ulcers, skin turgor is good  Neuro:  Cranial nerves 3-12 are grossly intact  Psych:  Good insight, oriented to person, place and time, alert    Medications Reviewed: (see below)    Lab Data Reviewed: (see below)    ______________________________________________________________________    Medications:     Current Facility-Administered Medications   Medication Dose Route Frequency    iothalamate meglumine (CONRAY 60) 60 % contrast solution        HYDROmorphone (PF) (DILAUDID) injection 0.5-1 mg  0.5-1 mg IntraVENous Q2H PRN    oxyCODONE-acetaminophen (PERCOCET) 5-325 mg per tablet 1 Tab  1 Tab Oral Q4H PRN    simethicone (MYLICON) tablet 80 mg  80 mg Oral QID PRN    pantoprazole (PROTONIX) 40 mg in sodium chloride 0.9 % 10 mL injection  40 mg IntraVENous Q12H    sucralfate (CARAFATE) 100 mg/mL oral suspension 1 g  1 g Oral QID    sodium chloride (NS) flush 5-10 mL  5-10 mL IntraVENous PRN    0.9% sodium chloride infusion  100 mL/hr IntraVENous CONTINUOUS    ondansetron (ZOFRAN) injection 4 mg  4 mg IntraVENous Q6H PRN    heparin (porcine) injection 5,000 Units  5,000 Units SubCUTAneous Q8H    gabapentin (NEURONTIN) capsule 100 mg  100 mg Oral TID    hydrALAZINE (APRESOLINE) 20 mg/mL injection 10 mg  10 mg IntraVENous Q6H PRN            Lab Review:     Recent Labs      01/18/17   0420  01/17/17   1729   WBC  6.7  6.5   HGB  11.7  11.9   HCT  34.3*  34.7*   PLT  177  169     Recent Labs      01/18/17   0420 01/16/17   0544   NA  141  138   K  3.5  3.6   CL  105  104   CO2  22  25   GLU  62*  98   BUN  10  9   CREA  0.91  0.89   CA  8.3*  8.2*   ALB  3.5   --    SGOT  190*   --    ALT  379*   --      No components found for: [place]        Total time spent with patient: 25 895 [address] discussed with: Patient    Discussed:  Care Plan    Prophylaxis:  Lovenox    Disposition:  Home w/Family           ___________________________________________________    Attending Physician: Cristiano Hurtado MD

## 2017-01-19 NOTE — ROUTINE PROCESS
Interdisciplinary team rounds were held 1/19/2017 with the following team members:Care Management, Nursing, Pharmacy, Physical Therapy and Physician and the patient and spouse. Plan of care discussed. See clinical pathway and/or care plan for interventions and desired outcomes.     Plan:  Continue treatment plan

## 2017-01-19 NOTE — PROGRESS NOTES
GI PROGRESS NOTE    NAME:             Marianna Gale   :              1955   MRN:              [id number]   Admit Date:     2017  Todays Date:  2017      Subjective:         She reports feeling much better this morning after her ERCP and lap seth yesterday. Tolerating a clear liquid diet and would like to advance the diet. Associates nausea with the pain medication. Denies vomiting. Abdominal pain has improved, just post-op pain, she thinks.       Medications-reviewed     Current Facility-Administered Medications   Medication Dose Route Frequency    HYDROmorphone (PF) (DILAUDID) injection 0.5-1 mg  0.5-1 mg IntraVENous Q2H PRN    oxyCODONE-acetaminophen (PERCOCET) 5-325 mg per tablet 1 Tab  1 Tab Oral Q4H PRN    simethicone (MYLICON) tablet 80 mg  80 mg Oral QID PRN    pantoprazole (PROTONIX) 40 mg in sodium chloride 0.9 % 10 mL injection  40 mg IntraVENous Q12H    sucralfate (CARAFATE) 100 mg/mL oral suspension 1 g  1 g Oral QID    sodium chloride (NS) flush 5-10 mL  5-10 mL IntraVENous PRN    0.9% sodium chloride infusion  100 mL/hr IntraVENous CONTINUOUS    ondansetron (ZOFRAN) injection 4 mg  4 mg IntraVENous Q6H PRN    heparin (porcine) injection 5,000 Units  5,000 Units SubCUTAneous Q8H    gabapentin (NEURONTIN) capsule 100 mg  100 mg Oral TID    hydrALAZINE (APRESOLINE) 20 mg/mL injection 10 mg  10 mg IntraVENous Q6H PRN        Objective:     Patient Vitals for the past 8 hrs:   BP Temp Pulse Resp SpO2   17 0829 160/75 98.1 °F (36.7 °C) (!) 56 18 98 %         1901 -  0700  In: 4635 [I.V.:4635]  Out: 1225 [Urine:1200]    EXAM:     NEURO- A&O, NAD, sitting in chair, appears comfortable   HEENT- WNL   LUNGS- CTA   COR- RRR   ABD-soft , no distention, mild ttp, good bs       LABS:  Recent Labs      17   0349  17   0420   WBC  8.6  6.7   HGB  11.0*  11.7   HCT  32.2*  34.3*   PLT  173  177     Recent Labs      17   0349  17   0420   NA 141  141   K  4.3  3.5   CL  106  105   CO2  23  22   BUN  13  10   CREA  0.91  0.91   GLU  95  62*   CA  8.2*  8.3*     Recent Labs      01/19/17   0349  01/18/17   0420   SGOT  175*  190*   AP  288*  311*   TBILI  1.4*  2.5*   TP  6.5  7.1   ALB  3.0*  3.5   GLOB  3.5  3.6   LPSE  80  146   ALT  323*  379*                            Assessment:   1. CBD stone, s/p ERCP and lap seth 1/18/17. Feeling much better with significantly decreased abdominal pain. 2. Nausea associated with pain meds. Tolerating clear liquids, would like to try to advance diet. 3. UTI  4. GERD         Active Problems:    UTI (urinary tract infection) (1/13/2017)        Plan:   1. Advance to full liquids today  2. Continue pantoprazole  3. Continue carafate      GI Attending---Pt seen and examined. Agree with above.

## 2017-01-20 VITALS
TEMPERATURE: 98.5 F | SYSTOLIC BLOOD PRESSURE: 140 MMHG | RESPIRATION RATE: 16 BRPM | DIASTOLIC BLOOD PRESSURE: 69 MMHG | HEART RATE: 72 BPM | WEIGHT: 181.88 LBS | BODY MASS INDEX: 28.55 KG/M2 | OXYGEN SATURATION: 100 % | HEIGHT: 67 IN

## 2017-01-20 PROCEDURE — 74011250637 HC RX REV CODE- 250/637: Performed by: INTERNAL MEDICINE

## 2017-01-20 PROCEDURE — 74011250636 HC RX REV CODE- 250/636: Performed by: INTERNAL MEDICINE

## 2017-01-20 RX ORDER — DOCUSATE SODIUM 100 MG/1
100 CAPSULE, LIQUID FILLED ORAL 2 TIMES DAILY
Qty: 30 CAP | Refills: 2 | Status: SHIPPED | OUTPATIENT
Start: 2017-01-20 | End: 2017-02-03

## 2017-01-20 RX ORDER — OXYCODONE AND ACETAMINOPHEN 5; 325 MG/1; MG/1
1-2 TABLET ORAL
Qty: 40 TAB | Refills: 0 | Status: SHIPPED | OUTPATIENT
Start: 2017-01-20

## 2017-01-20 RX ORDER — IBUPROFEN 600 MG/1
600 TABLET ORAL
Qty: 40 TAB | Refills: 0 | Status: SHIPPED | OUTPATIENT
Start: 2017-01-20

## 2017-01-20 RX ADMIN — PANTOPRAZOLE SODIUM 40 MG: 40 TABLET, DELAYED RELEASE ORAL at 09:52

## 2017-01-20 RX ADMIN — GABAPENTIN 100 MG: 100 CAPSULE ORAL at 09:52

## 2017-01-20 RX ADMIN — HEPARIN SODIUM 5000 UNITS: 5000 INJECTION, SOLUTION INTRAVENOUS; SUBCUTANEOUS at 03:37

## 2017-01-20 RX ADMIN — SUCRALFATE 1 G: 1 SUSPENSION ORAL at 09:52

## 2017-01-20 RX ADMIN — OXYCODONE HYDROCHLORIDE AND ACETAMINOPHEN 2 TABLET: 5; 325 TABLET ORAL at 03:37

## 2017-01-20 NOTE — DISCHARGE INSTRUCTIONS
Dr Smith Night Discharge Instructions after  Laparoscopic Cholecystectomy      Malindakisha Phong [id number] : 1955    Admitted 2017 Discharged: 2017       What to do at Home    Recommended diet: Low Fat Diet for 1 month    Recommended activity: as tolerated, do not drive for 3-5 days while on pain medicine. Follow-up with Dr. Gildardo Breaux in 2 weeks. Call [phone] for an appointment. Hold lopressor or metoprolol till you see your PCP    Follow-up Information     Follow up With Details Comments Pollo Webb MD Schedule an appointment as soon as possible for a visit in 1 week  [facility] [address] [id number]  [phone]      Brandy Smith MD Schedule an appointment as soon as possible for a visit in 2 weeks  [address] [place] [address] [address] [place] [address]  [phone]              Cholecystectomy: What to Expect at [facility]    After your surgery, it is normal to feel weak and tired for several days after you return home. Your belly may be swollen. If you had laparoscopic surgery, you may also have pain in your shoulder for about 24 hours. You may have gas or need to burp a lot at first, and a few people get diarrhea. The diarrhea usually goes away in 2 to 4 weeks, but it may last longer. How quickly you recover depends on whether you had a laparoscopic or open surgery. For a laparoscopic surgery, most people can go back to work or their normal routine in 1 to 2 weeks, but it may take longer, depending on the type of work you do. For an open surgery, it will probably take 4 to 6 weeks before you get back to your normal routine. This care sheet gives you a general idea about how long it will take for you to recover. However, each person recovers at a different pace. Follow the steps below to get better as quickly as possible. How can you care for yourself at home?     Activity  Rest when you feel tired. Getting enough sleep will help you recover. Try to walk each day. Start out by walking a little more than you did the day before. Gradually increase the amount you walk. Walking boosts blood flow and helps prevent pneumonia and constipation. Avoid strenuous activities, such as biking, jogging, weightlifting, and aerobic exercise, for 1-2 weeks. You may shower 24 hours after surgery. Pat the cut (incision) dry. Do not take a bath for the first 2 weeks, or until your doctor tells you it is okay. You may drive when you are no longer taking pain medicine and can quickly move your foot from the gas pedal to the brake. You must also be able to sit comfortably for a long period of time, even if you do not plan to go far. For a laparoscopic surgery, most people can go back to work or their normal routine in 1 to 2 weeks, but it may take longer. For an open surgery, it will probably take 4 to 6 weeks before you get back to your normal routine. Diet  Eat smaller meals more often instead of fewer larger meals. You can eat a normal diet, but avoid eating fatty foods for about 1 month. Fatty foods include hamburger, whole milk, cheese, and many snack foods. If your stomach is upset, try bland, low-fat foods like plain rice, broiled chicken, toast, and yogurt. Drink plenty of fluids (unless your doctor tells you not to). If you have diarrhea, try avoiding spicy foods, dairy products, fatty foods, and alcohol. You can also watch to see if specific foods cause it, and stop eating them. If the diarrhea continues for more than 2 weeks, talk to your doctor. You may notice that your bowel movements are not regular right after your surgery. This is common. Try to avoid constipation and straining with bowel movements. You may want to take a fiber supplement every day. If you have not had a bowel movement after a couple of days, ask your doctor about taking a mild laxative.     Medicines  Take pain medicines exactly as directed. If the doctor gave you a prescription medicine for pain, take it as prescribed. If you are not taking a prescription pain medicine, take an over-the-counter medicine such as acetaminophen (Tylenol), ibuprofen (Advil, Motrin), or naproxen (Aleve). Read and follow all instructions on the label. Do not take two or more pain medicines at the same time unless the doctor told you to. Many pain medicines contain acetaminophen, which is Tylenol. Too much Tylenol can be harmful. If you think your pain medicine is making you sick to your stomach: Take your medicine after meals (unless your doctor tells you not to). Ask your doctor for a different pain medicine. If your doctor prescribed antibiotics, take them as directed. Do not stop taking them just because you feel better. You need to take the full course of antibiotics. Incision care  After 24 to 48 hours, wash the area daily with warm, soapy water, and pat it dry. You may have staples to hold the cut together. Keep them dry until your doctor takes them out. This is usually in 7 to 10 days. Keep the area clean and dry. You may cover it with a gauze bandage if it weeps or rubs against clothing. Change the bandage every day. Ice  To reduce swelling and pain, put ice or a cold pack on your belly for 10 to 15 minutes at a time. Do this every 1 to 2 hours. Put a thin cloth between the ice and your skin. Follow-up care is a key part of your treatment and safety. Be sure to make and go to all appointments, and call your doctor if you are having problems. Its also a good idea to know your test results and keep a list of the medicines you take. When should you call for help? Call 911 anytime you think you may need emergency care. For example, call if:  You pass out (lose consciousness). You have severe trouble breathing. You have sudden chest pain and shortness of breath, or you cough up blood.     Call your doctor now or seek immediate medical care if:  You are sick to your stomach and cannot drink fluids. You have pain that does not get better when you take your pain medicine. You have signs of infection, such as: Increased pain, warmth, or excessive (>1inch) redness. Red streaks leading from the incision. Pus draining from the incision. Swollen lymph nodes in your neck, armpits, or groin. A fever. Your urine turns dark brown or your stool is light-colored or andrae-colored. Your skin turns yellow. Bright red blood has soaked through a large bandage over your incision. You have signs of a blood clot, such as:  Pain in your calf, back of knee, thigh, or groin. Redness and swelling in your leg or groin. You have trouble passing urine or stool, especially if you have mild pain or swelling in your lower belly. Watch closely for any changes in your health, and be sure to contact your doctor if:  You do not have a bowel movement after taking a laxative.

## 2017-01-20 NOTE — PROGRESS NOTES
Date: 1/20/2017    Re: Ethan Wiclox    To whom it may concern;  Please excuse Ethan Wilcox from work from 1/13/2017 to 2/07/2017 as she was under my care for a medical condition in the hospital. He/she may return to work on the specified date above, resuming their full duties. Please contact me with questions, thank you.       Carlene Baumann MD  Hospitalist, [facility]  Office [phone]

## 2017-01-20 NOTE — PROGRESS NOTES
Bedside and Verbal shift change report given to Temo Melendez (oncoming nurse) by Rebecca Gold RN (offgoing nurse). Report included the following information SBAR, Kardex, Intake/Output and MAR. Zone Phone for oncoming shift:   [phone]    Shift Summary: Possible d/c 1/20, patient able to walk to bathroom to void, stays drowsy from pain medications-gave Percocet twice from pain. Morning blood work not drawn, 3 RNs tried to get blood and then patient refused any more sticks. LDAs               Peripheral IV 01/13/17 Right Antecubital (Active)   Site Assessment Clean, dry, & intact 1/20/2017  3:07 AM   Phlebitis Assessment 0 1/20/2017  3:07 AM   Infiltration Assessment 1 1/20/2017  3:07 AM   Dressing Status Clean, dry, & intact 1/20/2017  3:07 AM   Dressing Type Transparent; Topical skin adhesive 1/20/2017  3:07 AM   Hub Color/Line Status Pink 1/20/2017  3:07 AM   Action Taken Open ports on tubing capped 1/17/2017  2:41 PM   Alcohol Cap Used Yes 1/20/2017  3:07 AM                        Intake & Output     Last Bowel Movement Last Bowel Movement Date: 01/17/17   Glucose Checks [x] N/A  [] AC/HS  [] Q6  Concerns:   Nutrition Active Orders   Diet    DIET GI LITE (POST SURGICAL)       Consults [x]PT  [x]OT  []Speech  []Case Management   Cardiac Monitoring [x]N/A []Yes Expires:

## 2017-01-20 NOTE — PROGRESS NOTES
I have reviewed discharge instructions with the patient. The patient verbalized understanding. Patient was given a copy of discharge instructions and prescriptions.

## 2017-01-20 NOTE — PROGRESS NOTES
Admit Date: 2017  POD 2 Days Post-Op  Status/Post:  Procedure(s):  CHOLECYSTECTOMY ROBOTIC ASSISTED WITH INTRAOPERATIVE CHOLANGIOGRAM  ENDOSCOPIC RETROGRADE CHOLANGIOPANCREATOGRAPHY,SPHINCTEROTOMY, BALLOON SWEEP STONE EXTRACTION    Assessment:     Active Problems:    UTI (urinary tract infection) (2017)        Plan/Recommendations/Medical Decision Making:     Doing well  thomas PO  Pain controlled with percocet    DC home    -------------------------------------------------------------------------------------------------------------------      Subjective:     Patient has no complaints. no nausea      Objective:     Blood pressure 142/62, pulse 75, temperature 98.2 °F (36.8 °C), resp. rate 16, height 5' 7\" (1.702 m), weight 82.5 kg (181 lb 14.1 oz), SpO2 93 %. Temp (24hrs), Av.2 °F (36.8 °C), Min:98.1 °F (36.7 °C), Max:98.2 °F (36.8 °C)      Physical Exam:   Abdominal exam: soft  non-distended  appropriatly tender. Wound: clean, dry, no drainage    Labs: No results found for this or any previous visit (from the past 24 hour(s)).     Data Review

## 2017-01-20 NOTE — PROGRESS NOTES
Hospitalist    Peer to peer review called 1/20/2016    Dr Nathan Vines, awaiting a call back    Erick Cordoba MD

## 2017-01-20 NOTE — DISCHARGE SUMMARY
Pollo De La Cruz      NAME: Justin Hook   :  1955  MRM:  [id number]    Date/Time: 2017     Hospitalist Discharge Note     Admit date: 2017    Discharge date: 17    PCP: James Burgos MD    Discharge Diagnoses:    Cholelithiasis POA s/p cholecystectomy 2017    Choledocholithiasis POA s/p ERCP and sphincterotomy 2017    Severe epigastric pain POA    Acute renal failure POA Admit BUN/creat 25/2.18    Intractable nausea and Vomiting POA    Essential HTN POA    Chronic pain: POA    Code status: FULL       Discharge Medications:  Current Discharge Medication List      START taking these medications    Details   oxyCODONE-acetaminophen (PERCOCET) 5-325 mg per tablet Take 1-2 Tabs by mouth every four (4) hours as needed for Pain. Max Daily Amount: 12 Tabs. Qty: 40 Tab, Refills: 0      ibuprofen (MOTRIN) 600 mg tablet Take 1 Tab by mouth every six (6) hours as needed for Pain. Qty: 40 Tab, Refills: 0      docusate sodium (COLACE) 100 mg capsule Take 1 Cap by mouth two (2) times a day for 14 days. Stop taking if you have diarrhea  Qty: 30 Cap, Refills: 2         CONTINUE these medications which have NOT CHANGED    Details   irbesartan (AVAPRO) 300 mg tablet Take 300 mg by mouth daily. gabapentin (NEURONTIN) 100 mg capsule Take 100 mg by mouth three (3) times daily. lansoprazole (PREVACID) 30 mg capsule Take 20 mg by mouth Daily (before breakfast). STOP taking these medications       metoprolol (LOPRESSOR) 100 mg tablet Comments:   Reason for Stopping:         acetaminophen (TYLENOL) 500 mg tablet Comments:   Reason for Stopping:                Follow-up Information     Follow up With Details Comments Pollo Webb MD   [address] [address]  [phone]            Time spent on discharge:   I spent greater than 30 minutes on discharge, seeing and examining the patient, reconciling home meds and new meds, coordinating care with case management, doing the discharge papers and the D/C summary    Discharge disposition: home    Discharge Condition: Stable    Summary of admission H+P(copied from Dr Lizy Coronel Note):     CHIEF COMPLAINT: dark urine/n/v     HISTORY OF PRESENT ILLNESS:   Ann Cortes is a 62 y.o. Marya Graham female with a hx of acid reflux, HTN, and fibromyalgia presenting to the ED C/O epigastric abd pain which started 4 days ago. that started on Monday  Associated symptoms include chills, nausea, and vomiting. Pt [facility] where she was noted to have an uti started on bactrim ,pt Went to work but was not feeling well- went home had a few episodes of nausea w/out vomiting, no blood- in the vomitus. No back pain. SHx significant for . Patient denies diarrhea, hematochezia, fever, or any other symptoms or complaints.     We were asked to admit for work up and evaluation of the above problems.            Past Medical History   Diagnosis Date    Gastrointestinal disorder         acid reflux    Hypertension      Other ill-defined conditions(799.89)         fibromyalgia      Admit CT scan abdomen/pelvis  FINDINGS:   LUNG BASES: Clear. INCIDENTALLY IMAGED HEART AND MEDIASTINUM: Unremarkable. LIVER: No mass or biliary dilatation. GALLBLADDER: Unremarkable. SPLEEN: No mass. PANCREAS: No mass or ductal dilatation. ADRENALS: Unremarkable. KIDNEYS/URETERS: No mass, calculus, or hydronephrosis. STOMACH: Unremarkable. SMALL BOWEL: No dilatation or wall thickening. COLON: No dilatation or wall thickening. There is diverticulosis. APPENDIX: Unremarkable. PERITONEUM: No ascites or pneumoperitoneum. RETROPERITONEUM: No lymphadenopathy or aortic aneurysm. REPRODUCTIVE ORGANS: Normal  URINARY BLADDER: No mass or calculus. BONES: No destructive bone lesion. There is facet arthropathy.   ADDITIONAL COMMENTS: N/A  IMPRESSION:  No acute findings. Abdominal US FINDINGS:  LIVER:   The liver is homogeneous in echotexture with no focal abnormality appreciated. LIVER VASCULATURE:   The portal vein flow is appropriate towards the liver. GALLBLADDER:  The gallbladder contains shadowing gallstones There is no wall thickening or  fluid around the gallbladder. COMMON BILE DUCT:  The extrahepatic bile duct measures 0.5-0.6 which is upper normal to slightly  dilated. PANCREAS:  The visualized pancreas is unremarkable. SPLEEN:  The spleen is normal in size measuring 9.8 cm in length. RIGHT KIDNEY:  The right kidney demonstrates no hydronephrosis. The right kidney measures 11.5  cm in length. LEFT KIDNEY:  The left kidney demonstrates no hydronephrosis. The left kidney measures 11.2 cm  in length. RETROPERITONEUM:  The abdominal aorta and aortic bifurcation are normal in caliber. The IVC is unremarkable. No ascites is identified. IMPRESSION: Gallstones and upper normal to slightly dilated extrahepatic Yannick  dominant.     NM HIDA scan FINDINGS:  The initial images demonstrate prompt hepatic tracer uptake. The common bile  duct is visualized at 16 minutes. The gallbladder is visualized at 60 minutes. It demonstrates progressive filling. The duodenum is visualized at 60 minutes. Post CCK images demonstrate decreased gallbladder contraction. Calculated  ejection fraction between 0 and 30 minutes is 11 %. IMPRESSION: Biliary dyskinesia.     ERCP Findings:   Endoscopic: -normal esophagus, stomach, and duodenum  Ampulla: impacted stone  Cholangiogram: -Dilated cbd with stone at level of ampulla  Pancreatogram:not performed  Biliary: Biliary sphinctetomy performed and single yellow stone removed       Hospital course:        Cholelithiasis POA s/p cholecystectomy 1/18/2017  Choledocholithiasis POA s/p ERCP and sphincterotomy 1/18/2017  Severe epigastric pain: POA  Intractable nausea and Vomiting POA  Admit CT scan negative, normal LFTs and lipase  EGD was normal  Work up eventually lead to diagnosis, had increasing LFTs yesterday T bili 2.5  OR for cholecystectomy and E RCP to remove impacted distal stone 1/18/2017  Clinically improving, diet advanced, D/C to home    Acute renal failure POA likely pre renal from volume depletion from N/V  Admit BUN/creat 25/2.18, prior baseline 0.8 to 0.9  IVF, resolved and creatinine back to baseline    Essential HTN POA  BP reasonably controlled off meds  Resume hugo[pro at discharge, hold lopressor till PCP follow up     Chronic pain: POA  Resume home meds    Code status: FULL   VTE PROPHYLAXIS:heparin               Subjective:     Chief Complaint:  Still mildly sore in abdomen in RUQ, better with pain meds  No N/V, tolerating diet  No CP or SOB      ROS:  Constitutional: negative for fevers  Respiratory: negative for cough,SOB  Cardiovascular: negative for CP or orthopnea  Gastrointestinal: no N/V, positive Mild abdominal pain, no iarrhea  Genitourinary:negative for frequency and dysuria   Endocrine: Denies heat or cold intolerance       Objective:     Last 24hrs VS reviewed since prior progress note.  Most recent are:    Visit Vitals    /62    Pulse 75    Temp 98.2 °F (36.8 °C)    Resp 16    Ht 5' 7\" (1.702 m)    Wt 82.5 kg (181 lb 14.1 oz)    SpO2 93%    BMI 28.49 kg/m2     SpO2 Readings from Last 6 Encounters:   01/20/17 93%   02/18/16 100%   04/15/13 97%    O2 Flow Rate (L/min): 2 l/min     No intake or output data in the 24 hours ending 01/20/17 0856       Exam:     Physical Exam:    Gen:  Well-developed, well-nourished, in no acute distress  HEENT:  hearing intact to voice, moist mucous membranes  Resp:  No accessory muscle use, clear breath sounds without wheezes rales or rhonchi  Card:  No murmurs, normal S1, S2 without thrills, bruits or peripheral edema  Abd:  Soft, mildly tender RUQ, non-distended, normoactive bowel sounds are present  Musc:  No cyanosis or clubbing  Skin:  No rashes, skin turgor is good  Neuro:  Cranial nerves 3-12 are grossly intact, ambulatory in room  Psych:  Good insight, oriented to person, place and time, alert    Medications Reviewed: (see below)    Lab Data Reviewed: (see below)    ______________________________________________________________________    Medications:     Current Facility-Administered Medications   Medication Dose Route Frequency    oxyCODONE-acetaminophen (PERCOCET) 5-325 mg per tablet 1-2 Tab  1-2 Tab Oral Q6H PRN    HYDROmorphone (PF) (DILAUDID) injection 0.5 mg  0.5 mg IntraVENous Q3H PRN    pantoprazole (PROTONIX) tablet 40 mg  40 mg Oral ACB    simethicone (MYLICON) tablet 80 mg  80 mg Oral QID PRN    sucralfate (CARAFATE) 100 mg/mL oral suspension 1 g  1 g Oral QID    sodium chloride (NS) flush 5-10 mL  5-10 mL IntraVENous PRN    ondansetron (ZOFRAN) injection 4 mg  4 mg IntraVENous Q6H PRN    heparin (porcine) injection 5,000 Units  5,000 Units SubCUTAneous Q8H    gabapentin (NEURONTIN) capsule 100 mg  100 mg Oral TID    hydrALAZINE (APRESOLINE) 20 mg/mL injection 10 mg  10 mg IntraVENous Q6H PRN            Lab Review:     Recent Labs      01/19/17   0349 01/18/17   0420  01/17/17   1729   WBC  8.6  6.7  6.5   HGB  11.0*  11.7  11.9   HCT  32.2*  34.3*  34.7*   PLT  173  177  169     Recent Labs      01/19/17   0349 01/18/17   0420   NA  141  141   K  4.3  3.5   CL  106  105   CO2  23  22   GLU  95  62*   BUN  13  10   CREA  0.91  0.91   CA  8.2*  8.3*   ALB  3.0*  3.5   SGOT  175*  190*   ALT  323*  379*     No components found for: GLPOC        Total time spent with patient: 20 Minutes                  Care Plan discussed with: Patient    Discussed:  Care Plan    Prophylaxis:  Lovenox    Disposition:  Home w/Family           ___________________________________________________    Attending Physician: Danay Donaldson MD

## 2017-02-02 ENCOUNTER — OFFICE VISIT (OUTPATIENT)
Dept: SURGERY | Age: 62
End: 2017-02-02

## 2017-02-02 VITALS
WEIGHT: 181.5 LBS | RESPIRATION RATE: 18 BRPM | DIASTOLIC BLOOD PRESSURE: 72 MMHG | OXYGEN SATURATION: 99 % | BODY MASS INDEX: 28.49 KG/M2 | SYSTOLIC BLOOD PRESSURE: 133 MMHG | HEIGHT: 67 IN | HEART RATE: 63 BPM

## 2017-02-02 DIAGNOSIS — Z09 POSTOPERATIVE EXAMINATION: Primary | ICD-10-CM

## 2017-02-02 RX ORDER — SUCRALFATE 1 G/10ML
2 SUSPENSION ORAL 3 TIMES DAILY
Qty: 414 ML | Refills: 0 | Status: SHIPPED | OUTPATIENT
Start: 2017-02-02 | End: 2022-08-05

## 2017-02-02 NOTE — PROGRESS NOTES
Chief Complaint   Patient presents with    Surgical Follow-up     s/p lap seth 1/18/17     Path: acute and chronic cholecystitis    Tolerating PO  Pain controlled  Taking no pain meds  complete resolution of preoperative symptoms    Physical Exam:   Abdominal exam: soft  non-distended  appropriatly tender. Wound: clean, dry, no drainage    Doing well  Continue Carafate and prevacid until seen by Dr. Maegan Pulido unrestricted activity.   Follow-up: marlyn Bush MD FACS

## 2017-02-02 NOTE — MR AVS SNAPSHOT
Visit Information Date & Time Provider Department Dept. Phone Encounter #  
 2/2/2017 11:30 AM Drew Alexander MD [facility] [address] [phone] [id number] Upcoming Health Maintenance Date Due Hepatitis C Screening 1955 DTaP/Tdap/Td series (1 - Tdap) 7/23/1977 PAP AKA CERVICAL CYTOLOGY 7/23/1977 BREAST CANCER SCRN MAMMOGRAM 7/23/2006 FOBT Q 1 YEAR AGE 50-75 7/23/2006 ZOSTER VACCINE AGE 60> 7/23/2016 Allergies as of 2/2/2017  Review Complete On: 2/2/2017 By: Drew Alexander MD  
  
 Severity Noted Reaction Type Reactions Pcn [Penicillins]  04/14/2013    Hives Has had rocephin and ancef without reaction. Current Immunizations  Reviewed on 1/17/2017 Name Date Influenza Vaccine 10/1/2016 Not reviewed this visit You Were Diagnosed With   
  
 Codes Comments Postoperative examination    -  Primary ICD-10-CM: O15 ICD-9-CM: V67.00 Vitals BP Pulse Resp Height(growth percentile) Weight(growth percentile) SpO2  
 133/72 (BP 1 Location: Right arm, BP Patient Position: Sitting) 63 18 5' 7\" (1.702 m) 181 lb 8 oz (82.3 kg) 99% BMI OB Status Smoking Status 28.43 kg/m2 Menopause Never Smoker BMI and BSA Data Body Mass Index Body Surface Area  
 28.43 kg/m 2 1.97 m 2 Preferred Pharmacy Pharmacy Name Phone [phone] [facility] [phone] Your Updated Medication List  
  
   
This list is accurate as of: 2/2/17 12:18 PM.  Always use your most recent med list.  
  
  
  
  
 AVAPRO 300 mg tablet Generic drug:  irbesartan Take 300 mg by mouth daily. docusate sodium 100 mg capsule Commonly known as:  Ltanya Parish Take 1 Cap by mouth two (2) times a day for 14 days. Stop taking if you have diarrhea  
  
 gabapentin 100 mg capsule Commonly known as:  NEURONTIN  
 Take 100 mg by mouth three (3) times daily. ibuprofen 600 mg tablet Commonly known as:  MOTRIN Take 1 Tab by mouth every six (6) hours as needed for Pain. oxyCODONE-acetaminophen 5-325 mg per tablet Commonly known as:  PERCOCET Take 1-2 Tabs by mouth every four (4) hours as needed for Pain. Max Daily Amount: 12 Tabs. PREVACID 30 mg capsule Generic drug:  lansoprazole Take 20 mg by mouth Daily (before breakfast). sucralfate 100 mg/mL suspension Commonly known as:  Wilhelminia Merida Take 10 mL by mouth three (3) times daily. Prescriptions Sent to Pharmacy Refills  
 sucralfate (CARAFATE) 100 mg/mL suspension 0 Sig: Take 10 mL by mouth three (3) times daily. Class: Normal  
 Pharmacy: [address] #: [phone] Route: Oral  
  
Introducing [facility]! Mary Miles introduces [employer] patient portal. Now you can access parts of your medical record, email your doctor's office, and request medication refills online. 1. In your internet browser, go to [email or web address] 2. Click on the First Time User? Click Here link in the Sign In box. You will see the New Member Sign Up page. 3. Enter your [employer] Access Code exactly as it appears below. You will not need to use this code after youve completed the sign-up process. If you do not sign up before the expiration date, you must request a new code. · [employer] Access Code: [email or web address] Expires: 4/13/2017  3:04 PM 
 
4. Enter the last four digits of your Social Security Number (xxxx) and Date of Birth (mm/dd/yyyy) as indicated and click Submit. You will be taken to the next sign-up page. 5. Create a [employer] ID. This will be your [employer] login ID and cannot be changed, so think of one that is secure and easy to remember. 6. Create a [employer] password. You can change your password at any time. 7. Enter your Password Reset Question and Answer. This can be used at a later time if you forget your password. 8. Enter your e-mail address. You will receive e-mail notification when new information is available in [address] 9. Click Sign Up. You can now view and download portions of your medical record. 10. Click the Download Summary menu link to download a portable copy of your medical information. If you have questions, please visit the Frequently Asked Questions section of the [employer] website. Remember, [employer] is NOT to be used for urgent needs. For medical emergencies, dial 911. Now available from your iPhone and Android! Please provide this summary of care documentation to your next provider. Your primary care clinician is listed as [address]. If you have any questions after today's visit, please call [phone].

## 2017-02-03 ENCOUNTER — TELEPHONE (OUTPATIENT)
Dept: SURGERY | Age: 62
End: 2017-02-03

## 2017-02-03 NOTE — TELEPHONE ENCOUNTER
Her liver function results are abnormal. Need to talk with Dr. Alida Rodriguez about the results. Please call.

## 2017-02-03 NOTE — TELEPHONE ENCOUNTER
Pt received lab results from her PCP that was drawn on 1/28, concerned about abnormal LFTS. Instructed pt to call her PCP to review results. She is agreeable. Opportunity for questions with clarifications.

## 2019-02-08 ENCOUNTER — HOSPITAL ENCOUNTER (OUTPATIENT)
Dept: MRI IMAGING | Age: 64
Discharge: HOME | End: 2019-02-08
Attending: FAMILY MEDICINE

## 2019-02-08 DIAGNOSIS — R51.9 ACUTE HEADACHE: ICD-10-CM

## 2020-09-27 NOTE — PROGRESS NOTES
[facility]      NAME: Ced Rankin   :  1955  MRM:  [id number]    Date/Time: 2017  12:44 PM      Norris Donaldson MD  Hospitalist Progress Note        Interim Hospital Summary: 62 y.o. female whom presented on 2017 with        Assessment / Plan:    C/o severe epigastric pain: POA  So far w/u neg   EGD was normal  Biopsies pending  PPI BID  Appreciate GI consult  GI ordered abd US and HIDA scan, will follow  Cont prn pain meds    Intractable nausea and Vomiting: POA  Vomiting resolved, c/o nausea again this morning  UA was concerning for UTI but urine cx not impressive  Acute renal failure likely pre renal from volume depletion-resolved  -treated with 3 days abx, then d/duong.   -cont IV fluids  -avoid nephrotoxic drugs  -dose meds as per renal function    HTN: POA  BP reasonably controlled off meds, watch closely     chronic pain: POA  Resume home meds      Code status: FULL   VTE PROPHYLAXIS:heparin                   Subjective:     Chief Complaint: abd pain improving, no more N/V      ROS:  Constitutional: negative for fevers, chills, sweats, fatigue, malaise, anorexia and weight loss   Eyes: negative for irritation, redness and icterus   Ears, nose, mouth, throat, and face: negative for ear drainage, earaches, nasal congestion, sore mouth and sore throat   Respiratory: negative for cough, sputum, hemoptysis, pleurisy/chest pain, asthma, wheezing or dyspnea on exertion   Cardiovascular: negative for dyspnea, palpitations, irregular heart beats, near-syncope, syncope, orthopnea, paroxysmal nocturnal dyspnea, lower extremity edema, tachypnea   Gastrointestinal: as per hpi  Genitourinary:negative for frequency and dysuria   Hematologic/lymphatic: negative for easy bruising, bleeding, lymphadenopathy, petechiae and coughing up blood   Musculoskeletal:negative for myalgias, arthralgias and muscle weakness   Neurological: negative for headaches Patient Geovanna Reinoso Age 76 year old Sex female   MRN [id number] Encounter Date 9/24/2020      Chief Complaint    Chief Complaint   Patient presents with   • Weakness   • Chest Pain Adult   • Nausea       History of Present Illness  This 76-year-old female presents emergency department with nausea which has resulted in her having very poor oral intake for the past 48 hours.  The patient describes continuous nausea over the past couple of days transiently associated with a burning sensation in her epigastric area, and some abdominal discomfort as low as her umbilicus and below.  The patient has not had any vomiting.  The patient has colostomy after having a partial colectomy for diverticulitis.  The patient also has a history of a partial gastrectomy with a gastrojejunal bypass with a persistent limb.  The patient has had intermittent nausea and multiple evaluations going back at least 5 years.  She has also had dilatation of her esophagus.  The patient recently was evaluated and felt to have a possible biliary ductal obstruction.  She had a recent MRI of her biliary tract which did not reveal obstruction but the possibility of a stenotic area.  The patient still has her gallbladder.  She has had no fever, chills, blood in the stool.  The patient has been weak but has had a very poor oral intake.  She has had no fever, chills, pain or burning with urination, cough or shortness of breath.  No jaw pain, arm pain, exertional discomfort.    Medical History    PAST MEDICAL HISTORY:  Past Medical History:   Diagnosis Date   • Arthritis     Rheumatoid/Osteo   • Blood clot associated with vein wall inflammation    • Cancer (CMS/HCC)    • Cataract    • Coronary artery disease    • Failed moderate sedation during procedure     woke up with iv sedation, and went right back to sleep    • Gastroesophageal reflux disease    • High cholesterol    • History of creation of ostomy (CMS/HCC)    • Hypertension    • Leg pain, left      and dizziness   Endocrine: Denies heat or cold intolerance       Objective:       Vitals:          Last 24hrs VS reviewed since prior progress note.  Most recent are:    Visit Vitals    /82    Pulse (!) 54    Temp 98.3 °F (36.8 °C)    Resp 28    Ht 5' 7\" (1.702 m)    Wt 82.5 kg (181 lb 14.1 oz)    SpO2 93%    BMI 28.49 kg/m2     SpO2 Readings from Last 6 Encounters:   01/17/17 93%   02/18/16 100%   04/15/13 97%            Intake/Output Summary (Last 24 hours) at 01/17/17 1453  Last data filed at 01/17/17 0919   Gross per 24 hour   Intake              590 ml   Output                0 ml   Net              590 ml          Exam:     Physical Exam:    Gen:  Well-developed, well-nourished, in no acute distress  HEENT:  Pink conjunctivae, PERRL, hearing intact to voice, moist mucous membranes  Neck:  Supple, without masses, thyroid non-tender  Resp:  No accessory muscle use, clear breath sounds without wheezes rales or rhonchi  Card:  No murmurs, normal S1, S2 without thrills, bruits or peripheral edema  Abd:  Soft, non-tender, non-distended, normoactive bowel sounds are present  Musc:  No cyanosis or clubbing  Skin:  No rashes or ulcers, skin turgor is good  Neuro:  Cranial nerves 3-12 are grossly intact  Psych:  Good insight, oriented to person, place and time, alert    Medications Reviewed: (see below)    Lab Data Reviewed: (see below)    ______________________________________________________________________    Medications:     Current Facility-Administered Medications   Medication Dose Route Frequency    oxyCODONE-acetaminophen (PERCOCET) 5-325 mg per tablet 1 Tab  1 Tab Oral Q4H PRN    morphine injection 1 mg  1 mg IntraVENous Q6H PRN    simethicone (MYLICON) tablet 80 mg  80 mg Oral QID PRN    pantoprazole (PROTONIX) 40 mg in sodium chloride 0.9 % 10 mL injection  40 mg IntraVENous Q12H    sucralfate (CARAFATE) 100 mg/mL oral suspension 1 g  1 g Oral QID    sodium chloride (NS) flush 5-10 mL  5-10 left leg and hip pain   • Osteoporosis    • Personal history of myeloid leukemia    • Psoriatic arthritis (CMS/HCC)     per pt report   • Ptosis of eyelid    • Seizure disorder (CMS/HCC)     no seizure in last year        PAST SURGICAL HISTORY:  Past Surgical History:   Procedure Laterality Date   • Appendectomy     • Belpharoptosis repair     • Bilroth ii procedure  2003    For gastroparesis of peptic ulcer disease   • Colon surgery  02/26/2015    Dr. Block.  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 Mendoza.    • Craniotomy  03/2014    Dr. Britton. Left frontoparietal temporal craniotomy for resection of subdural hematoma due to falls   • Craniotomy     • Esophagogastroduodenoscopy  2/1/2016   • Esophagogastroduodenoscopy  09/2016   • Esophagogastroduodenoscopy  05/03/2017    Serge Jordan MD   • Hysterectomy     • Laparoscopy,lysis of adhesions  05/24/2017    partial gastrectomy with RNY, Dr. Serge Jordan MD   • Levator advancement/resection Bilateral 08/29/2019    Dr. Qureshi   • Tonsillectomy         MEDICATIONS:     Medication List      Prior to Admission Medications      Sig   atorvastatin 40 MG tablet  Commonly known as: LIPITOR   40 mg, Oral, NIGHTLY     Childrens Gummies Chew Tab   1 tablet, Oral, DAILY     cloNIDine 0.2 MG/24HR  Commonly known as: CATAPRES-TTS 2   1 patch, Transdermal, 1 DAY A WEEK, Box includes patch and non-medicated adhesive cover. Apply adhesive cover if patch begins to lift.     cyanocobalamin 1000 MCG/ML injection   1,000 mcg, Subcutaneous, EVERY 7 DAYS, On Sunday.     ergocalciferol 1.25 mg (50,000 units) capsule  Commonly known as: DRISDOL   50,000 Units, Oral, 1 DAY A WEEK, On Friday     estradiol 1 MG tablet  Commonly known as: ESTRACE   1.5 mg, Oral, DAILY     gabapentin 600 MG tablet  Commonly known as: NEURONTIN   600 mg, Oral, 4 TIMES DAILY     levETIRAcetam 100 MG/ML oral  mL IntraVENous PRN    0.9% sodium chloride infusion  100 mL/hr IntraVENous CONTINUOUS    ondansetron (ZOFRAN) injection 4 mg  4 mg IntraVENous Q6H PRN    heparin (porcine) injection 5,000 Units  5,000 Units SubCUTAneous Q8H    gabapentin (NEURONTIN) capsule 100 mg  100 mg Oral TID    hydrALAZINE (APRESOLINE) 20 mg/mL injection 10 mg  10 mg IntraVENous Q6H PRN            Lab Review:     Recent Labs      01/15/17   0342   WBC  7.2   HGB  11.9   HCT  34.5*   PLT  165     Recent Labs      01/16/17   0544  01/15/17   1233  01/15/17   0342   NA  138   --   143   K  3.6   --   3.9   CL  104   --   108   CO2  25   --   25   GLU  98   --   88   BUN  9   --   10   CREA  0.89   --   1.09*   CA  8.2*   --   8.2*   ALB   --   3.9   --    SGOT   --   22   --    ALT   --   27   --      No components found for: GLPOC        Total time spent with patient: [phone] [address] discussed with: Patient    Discussed:  Care Plan    Prophylaxis:  Lovenox    Disposition:  Home w/Family           ___________________________________________________    Attending Physician: Dilan Arenas MD solution  Commonly known as: KepPRA   Take 2.5 ml (250mg) in morning and 5 ml (500 mg) at night     metoPROLOL succinate 50 MG 24 hr tablet  Commonly known as: TOPROL-XL   50 mg, Oral, 2 TIMES DAILY     Nitrostat 0.4 MG sublingual tablet  Generic drug: nitroGLYcerin   0.4 mg, Sublingual, EVERY 5 MIN PRN     pantoprazole 40 MG tablet  Commonly known as: PROTONIX   40 mg, Oral, DAILY     polyethylene glycol 17 g packet  Commonly known as: MIRALAX   17 g, Oral, DAILY PRN, Stir and dissolve powder in any 4 to 8 ounces of beverage, then drink.     spironolactone 25 MG tablet  Commonly known as: ALDACTONE   12.5 mg, Oral, DAILY     sucralfate 1 g tablet  Commonly known as: CARAFATE   1 g, Oral, 2 TIMES DAILY PRN     traMADol 50 MG tablet  Commonly known as: ULTRAM    mg, Oral, EVERY 4 HOURS PRN        New Prescriptions      Sig   * prochlorperazine 10 MG tablet  Commonly known as: COMPAZINE   10 mg, Oral, EVERY 8 HOURS PRN     * prochlorperazine 10 MG tablet  Commonly known as: COMPAZINE   Take 1/2 tablet by mouth every 6 hours as needed for Nausea.         * There are duplicate medications prescribed to the patient                  Allergies:    ALLERGIES:   Allergen Reactions   • Flagyl [Metronidazole Hcl] DIZZINESS   • Latex   (Environmental) HIVES   • Milk    (Food Or Med) Other (See Comments)     cramps   • Tylenol Other (See Comments)     Feels like head and body are swollen       SOCIAL HISTORY:  Social History     Tobacco Use   • Smoking status: Never Smoker   • Smokeless tobacco: Never Used   Substance Use Topics   • Alcohol use: No     Alcohol/week: 0.0 standard drinks     Frequency: Never     Drinks per session: 1 or 2     Binge frequency: Never   • Drug use: No       REVIEW OF SYSTEMS:  Please refer to the above history of present illness regarding all pertinent positives and negatives. All other systems were reviewed and are negative.    Physical Exam    ED Triage Vitals   ED Triage Vitals Group       Temp 09/24/20 1328 98.4 °F (36.9 °C)      Heart Rate 09/24/20 1332 71      Resp 09/24/20 1328 18      BP 09/24/20 1328 (!) 208/105      SpO2 09/24/20 1332 98 %      EtCO2 mmHg --       Height 09/24/20 1328 5' 7\" (1.702 m)      Weight 09/24/20 1328 136 lb 1.6 oz (61.7 kg)      Weight Scale Used 09/24/20 1328 Scale in bed      BMI (Calculated) 09/24/20 1328 21.32      IBW/kg (Calculated) 09/24/20 1328 61.6     Vitals:    09/24/20 1328 09/24/20 1332 09/24/20 1511   BP: (!) 208/105  138/75   Pulse:  71 (!) 59   Resp: 18  20   Temp: 98.4 °F (36.9 °C)     TempSrc: Oral     SpO2:  98% 95%   Weight: 61.7 kg     Height: 5' 7\" (1.702 m)       Physical Exam   Constitutional: She is oriented to person, place, and time. She appears well-developed.   Frail 76-year-old female appears in no acute distress   HENT:   Head: Normocephalic.   Mouth/Throat: Oropharynx is clear and moist. No oropharyngeal exudate.   Neck: Normal range of motion. Neck supple. No tracheal deviation present. No thyromegaly present.   Cardiovascular: Normal rate, normal heart sounds and intact distal pulses.   Pulmonary/Chest: Effort normal and breath sounds normal. No respiratory distress. She has no wheezes. She has no rales.   Abdominal: Soft. Bowel sounds are normal.   The patient has slight tenderness in the epigastric area and right upper quadrant.  She has no rebound or peritoneal signs.  She has a colostomy draining stool.  She has bowel sounds.   Neurological: She is alert and oriented to person, place, and time. No cranial nerve deficit. She exhibits normal muscle tone.     Diagnostic Studies    LABORATORY STUDIES:  Labs Reviewed   COMPREHENSIVE METABOLIC PANEL - Abnormal; Notable for the following components:       Result Value    Glucose 131 (*)     Glomerular Filtration Rate 89 (*)     GOT/AST 38 (*)     Globulin 4.2 (*)     A/G Ratio 0.9 (*)     All other components within normal limits   CBC WITH AUTOMATED DIFFERENTIAL (PERFORMABLE ONLY) -  Abnormal; Notable for the following components:    RBC 3.89 (*)     All other components within normal limits    Narrative:     This is an appended report.  These results have been appended to a previously verified report.   NT PROBNP - Abnormal; Notable for the following components:    NT-proBNP 891 (*)     All other components within normal limits   URINALYSIS MACROSCOPIC C&SII - Abnormal; Notable for the following components:    KETONES, URINALYSIS 20  (*)     All other components within normal limits   URINE, BACTERIAL CULTURE - Abnormal; Notable for the following components:    Urine, Bacterial Culture >100,000 CFU/mL Enterococcus faecalis (*)     All other components within normal limits   TROPONIN I ULTRA SENSITIVE - Normal   LACTIC ACID, VENOUS - Normal   LIPASE - Normal   PROTHROMBIN TIME - Normal   PARTIAL THROMBOPLASTIN TIME - Normal    Narrative:     PTT  Therapeutic Range:  45-70 seconds.   D DIMER, QUANTITATIVE - Normal    Narrative:     Values <0.50 mg/L (FEU) may be useful to help exclude DVT or PE in patients at low clinical risk for these disorders.   CBC WITH DIFFERENTIAL    Narrative:     The following orders were created for panel order CBC with Automated Differential.  Procedure                               Abnormality         Status                     ---------                               -----------         ------                     CBC with Automated Dif...[[id number]]  Abnormal            Final result                 Please view results for these tests on the individual orders.   LIGHT BLUE TOP   GOLD TOP   URINALYSIS & REFLEX MICROSCOPY WITH CULTURE IF INDICATED    Narrative:     The following orders were created for panel order Urinalysis & Reflex Microscopy With Culture If Indicated.  Procedure                               Abnormality         Status                     ---------                               -----------         ------                     Urinalysis & Reflex  Mi...[[id number]]  Abnormal            Final result                 Please view results for these tests on the individual orders.       IMAGING STUDIES:  XR Chest AP or PA   Final Result   IMPRESSION:   Stable chest x-ray, no acute findings      Signed by: Placido Nolasco           Results for orders placed or performed during the hospital encounter of 20   Electrocardiogram 12-Lead   Result Value Ref Range    REPORT TEXT                               [facility][note 3]                                           Test Date:    2020               Test Time:    13:27:12  Pat Name:     ROXANNA GATICA          Department:     Patient ID:   [id number]                  Room:         [facility]  Gender:       Female                   Technician:   BIRGIT  :          1944               Requested By:   Order Number:                          Reading MD:                                      Measurements  Intervals                              Axis            Rate:         74                       P:            70  NM:           186                      QRS:          -14  QRSD:         110                      T:            38  QT:           422                                      QTc:          468                                                                 Interpretive Statements  Normal sinus rhythm  Cannot rule out Inferior infarct , age undetermined         Procedures    ED Course:   Patient had excellent improvement of her nausea with Compazine  Medical Decision Making  This 76-year-old female presents emergency department with a primary complaint of nausea.  Review of her medical record reveals that she has had nausea multiple times in the past.  Over the past couple days it was bad enough that she has markedly diminished her oral intake.  The patient has multiple GI comorbidities.  She has a history of esophageal stricture, gastrectomy, gastro-jejunal bypass, possible gallbladder dysfunction,  possible stenosis of biliary tract.  At this time she has no fever, evidence of biliary obstruction, infection.  The patient and her son have requested referral to local provider which was provided I have also referred her to [facility] to help sort out her multiple GI comorbidities and find  some resolution to her nausea..  A HIDA scan for gallbladder may be a consideration.  For now I have recommended she increase her Compazine from 5 mg to 10 mg every 6 hours p.r.n.    Discharge Diagnosis    ED Diagnosis        Final diagnosis    Nausea     Associated Orders          SERVICE TO INTERNAL MEDICINE     SERVICE TO GASTROENTEROLOGY                      Steve Beard MD  09/27/20 [id number]

## 2022-03-19 PROBLEM — N39.0 UTI (URINARY TRACT INFECTION): Status: ACTIVE | Noted: 2017-01-13

## 2022-05-27 ENCOUNTER — APPOINTMENT (OUTPATIENT)
Dept: GENERAL RADIOLOGY | Age: 67
End: 2022-05-27
Attending: EMERGENCY MEDICINE
Payer: MEDICARE

## 2022-05-27 ENCOUNTER — HOSPITAL ENCOUNTER (EMERGENCY)
Age: 67
Discharge: HOME | End: 2022-05-27
Attending: EMERGENCY MEDICINE
Payer: MEDICARE

## 2022-05-27 VITALS
OXYGEN SATURATION: 98 % | HEIGHT: 67 IN | WEIGHT: 183 LBS | BODY MASS INDEX: 28.72 KG/M2 | RESPIRATION RATE: 15 BRPM | SYSTOLIC BLOOD PRESSURE: 136 MMHG | DIASTOLIC BLOOD PRESSURE: 66 MMHG | TEMPERATURE: 97.8 F | HEART RATE: 58 BPM

## 2022-05-27 DIAGNOSIS — S50.01XA CONTUSION OF RIGHT ELBOW, INITIAL ENCOUNTER: ICD-10-CM

## 2022-05-27 DIAGNOSIS — S80.01XA CONTUSION OF RIGHT KNEE, INITIAL ENCOUNTER: ICD-10-CM
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
## 2022-05-27 PROCEDURE — 99283 EMERGENCY DEPT VISIT LOW MDM: CPT

## 2022-05-27 PROCEDURE — 73562 X-RAY EXAM OF KNEE 3: CPT

## 2022-05-27 PROCEDURE — 73110 X-RAY EXAM OF WRIST: CPT

## 2022-05-27 PROCEDURE — 73070 X-RAY EXAM OF ELBOW: CPT

## 2022-05-27 PROCEDURE — 74011250637 HC RX REV CODE- 250/637: Performed by: EMERGENCY MEDICINE

## 2022-05-27 PROCEDURE — 73630 X-RAY EXAM OF FOOT: CPT

## 2022-05-27 RX ORDER — OXYCODONE HYDROCHLORIDE 5 MG/1
5 TABLET ORAL
Qty: 12 TABLET | Refills: 0 | Status: SHIPPED | OUTPATIENT
Start: 2022-05-27 | End: 2022-05-30

## 2022-05-27 RX ORDER — HYDROCODONE BITARTRATE AND ACETAMINOPHEN 5; 325 MG/1; MG/1
1 TABLET ORAL ONCE
Status: COMPLETED | OUTPATIENT
Start: 2022-05-27 | End: 2022-05-27

## 2022-05-27 RX ADMIN — HYDROCODONE BITARTRATE AND ACETAMINOPHEN 1 TABLET: 5; 325 TABLET ORAL at 20:08
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
## 2022-05-28 NOTE — ED NOTES
Ambulated patient at this time. Patient is able to walk but experiences pain when moving in and out of stretcher as well as during ambulation. MD notified.

## 2022-05-28 NOTE — ED NOTES
Keshia CANNON reviewed discharge instructions with the patient. The patient verbalized understanding. All questions and concerns were addressed. The patient is discharged by wheelchair with instructions and prescriptions in hand. Pt is alert and oriented x 4. Respirations are clear and unlabored.
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
## 2022-06-16 ENCOUNTER — TRANSCRIBE ORDER (OUTPATIENT)
Dept: SCHEDULING | Age: 67
End: 2022-06-16

## 2022-06-16 DIAGNOSIS — K21.9 ACID REFLUX: ICD-10-CM

## 2022-06-16 DIAGNOSIS — R13.10 DYSPHAGIA: ICD-10-CM

## 2022-06-16 DIAGNOSIS — R10.13 EPIGASTRIC PAIN: Primary | ICD-10-CM

## 2022-08-05 ENCOUNTER — ANESTHESIA EVENT (OUTPATIENT)
Dept: ENDOSCOPY | Age: 67
End: 2022-08-05
Payer: MEDICARE

## 2022-08-05 ENCOUNTER — ANESTHESIA (OUTPATIENT)
Dept: ENDOSCOPY | Age: 67
End: 2022-08-05
Payer: MEDICARE

## 2022-08-05 ENCOUNTER — HOSPITAL ENCOUNTER (OUTPATIENT)
Age: 67
Setting detail: OUTPATIENT SURGERY
Discharge: HOME | End: 2022-08-05
Attending: INTERNAL MEDICINE | Admitting: INTERNAL MEDICINE
Payer: MEDICARE

## 2022-08-05 VITALS
DIASTOLIC BLOOD PRESSURE: 61 MMHG | HEART RATE: 51 BPM | BODY MASS INDEX: 29.03 KG/M2 | HEIGHT: 67 IN | TEMPERATURE: 97.6 F | WEIGHT: 185 LBS | RESPIRATION RATE: 13 BRPM | OXYGEN SATURATION: 100 % | SYSTOLIC BLOOD PRESSURE: 154 MMHG

## 2022-08-05 PROCEDURE — 88305 TISSUE EXAM BY PATHOLOGIST: CPT

## 2022-08-05 PROCEDURE — 74011250636 HC RX REV CODE- 250/636: Performed by: ANESTHESIOLOGY

## 2022-08-05 PROCEDURE — 74011000250 HC RX REV CODE- 250: Performed by: ANESTHESIOLOGY

## 2022-08-05 PROCEDURE — 76060000031 HC ANESTHESIA FIRST 0.5 HR: Performed by: INTERNAL MEDICINE

## 2022-08-05 PROCEDURE — 76040000019: Performed by: INTERNAL MEDICINE

## 2022-08-05 PROCEDURE — 77030019988 HC FCPS ENDOSC DISP BSC -B: Performed by: INTERNAL MEDICINE

## 2022-08-05 PROCEDURE — 77030018712 HC DEV BLLN INFL BSC -B: Performed by: INTERNAL MEDICINE

## 2022-08-05 PROCEDURE — 74011250636 HC RX REV CODE- 250/636: Performed by: INTERNAL MEDICINE

## 2022-08-05 PROCEDURE — 2709999900 HC NON-CHARGEABLE SUPPLY: Performed by: INTERNAL MEDICINE

## 2022-08-05 RX ORDER — SODIUM CHLORIDE 0.9 % (FLUSH) 0.9 %
5-40 SYRINGE (ML) INJECTION AS NEEDED
Status: DISCONTINUED | OUTPATIENT
Start: 2022-08-05 | End: 2022-08-05 | Stop reason: HOSPADM

## 2022-08-05 RX ORDER — SODIUM CHLORIDE 9 MG/ML
100 INJECTION, SOLUTION INTRAVENOUS CONTINUOUS
Status: DISCONTINUED | OUTPATIENT
Start: 2022-08-05 | End: 2022-08-05 | Stop reason: HOSPADM

## 2022-08-05 RX ORDER — LIDOCAINE HYDROCHLORIDE 20 MG/ML
INJECTION, SOLUTION EPIDURAL; INFILTRATION; INTRACAUDAL; PERINEURAL AS NEEDED
Status: DISCONTINUED | OUTPATIENT
Start: 2022-08-05 | End: 2022-08-05 | Stop reason: HOSPADM

## 2022-08-05 RX ORDER — ATROPINE SULFATE 0.1 MG/ML
0.5 INJECTION INTRAVENOUS
Status: DISCONTINUED | OUTPATIENT
Start: 2022-08-05 | End: 2022-08-05 | Stop reason: HOSPADM

## 2022-08-05 RX ORDER — FLUMAZENIL 0.1 MG/ML
0.2 INJECTION INTRAVENOUS
Status: DISCONTINUED | OUTPATIENT
Start: 2022-08-05 | End: 2022-08-05 | Stop reason: HOSPADM

## 2022-08-05 RX ORDER — EPINEPHRINE 0.1 MG/ML
1 INJECTION INTRACARDIAC; INTRAVENOUS
Status: DISCONTINUED | OUTPATIENT
Start: 2022-08-05 | End: 2022-08-05 | Stop reason: HOSPADM

## 2022-08-05 RX ORDER — DEXLANSOPRAZOLE 60 MG/1
CAPSULE, DELAYED RELEASE ORAL DAILY
COMMUNITY

## 2022-08-05 RX ORDER — SODIUM CHLORIDE 0.9 % (FLUSH) 0.9 %
5-40 SYRINGE (ML) INJECTION EVERY 8 HOURS
Status: DISCONTINUED | OUTPATIENT
Start: 2022-08-05 | End: 2022-08-05 | Stop reason: HOSPADM

## 2022-08-05 RX ORDER — GLYCOPYRROLATE 0.2 MG/ML
INJECTION INTRAMUSCULAR; INTRAVENOUS AS NEEDED
Status: DISCONTINUED | OUTPATIENT
Start: 2022-08-05 | End: 2022-08-05 | Stop reason: HOSPADM

## 2022-08-05 RX ORDER — PROPOFOL 10 MG/ML
INJECTION, EMULSION INTRAVENOUS AS NEEDED
Status: DISCONTINUED | OUTPATIENT
Start: 2022-08-05 | End: 2022-08-05 | Stop reason: HOSPADM

## 2022-08-05 RX ORDER — NALOXONE HYDROCHLORIDE 0.4 MG/ML
0.4 INJECTION, SOLUTION INTRAMUSCULAR; INTRAVENOUS; SUBCUTANEOUS
Status: DISCONTINUED | OUTPATIENT
Start: 2022-08-05 | End: 2022-08-05 | Stop reason: HOSPADM

## 2022-08-05 RX ORDER — DEXTROMETHORPHAN/PSEUDOEPHED 2.5-7.5/.8
1.2 DROPS ORAL
Status: DISCONTINUED | OUTPATIENT
Start: 2022-08-05 | End: 2022-08-05 | Stop reason: HOSPADM

## 2022-08-05 RX ORDER — MIDAZOLAM HYDROCHLORIDE 1 MG/ML
.25-5 INJECTION, SOLUTION INTRAMUSCULAR; INTRAVENOUS
Status: DISCONTINUED | OUTPATIENT
Start: 2022-08-05 | End: 2022-08-05 | Stop reason: HOSPADM

## 2022-08-05 RX ADMIN — PROPOFOL 50 MG: 10 INJECTION, EMULSION INTRAVENOUS at 12:02

## 2022-08-05 RX ADMIN — PROPOFOL 100 MG: 10 INJECTION, EMULSION INTRAVENOUS at 11:57

## 2022-08-05 RX ADMIN — SODIUM CHLORIDE 100 ML/HR: 9 INJECTION, SOLUTION INTRAVENOUS at 11:33

## 2022-08-05 RX ADMIN — LIDOCAINE HYDROCHLORIDE 100 MG: 20 INJECTION, SOLUTION EPIDURAL; INFILTRATION; INTRACAUDAL; PERINEURAL at 11:57

## 2022-08-05 RX ADMIN — GLYCOPYRROLATE 0.2 MG: 0.2 INJECTION, SOLUTION INTRAMUSCULAR; INTRAVENOUS at 11:57

## 2022-08-05 NOTE — ANESTHESIA POSTPROCEDURE EVALUATION
Procedure(s):  ESOPHAGOGASTRODUODENOSCOPY (EGD)  ESOPHAGOGASTRODUODENAL (EGD) BIOPSY  ESOPHAGEAL DILATION. MAC, total IV anesthesia    Anesthesia Post Evaluation        Patient location during evaluation: PACU  Note status: Adequate. Level of consciousness: responsive to verbal stimuli and sleepy but conscious  Pain management: satisfactory to patient  Airway patency: patent  Anesthetic complications: no  Cardiovascular status: acceptable  Respiratory status: acceptable  Hydration status: acceptable  Comments: +Post-Anesthesia Evaluation and Assessment    Patient: Marta Brunson MRN: [id number]  SSN: [id number]   YOB: 1955  Age: 78 y.o. Sex: female      Cardiovascular Function/Vital Signs    BP (!) 154/61   Pulse (!) 51   Temp 36.4 °C (97.6 °F)   Resp 13   Ht 5' 7\" (1.702 m)   Wt 83.9 kg (185 lb)   SpO2 100%   Breastfeeding No   BMI 28.98 kg/m²     Patient is status post Procedure(s):  ESOPHAGOGASTRODUODENOSCOPY (EGD)  ESOPHAGOGASTRODUODENAL (EGD) BIOPSY  ESOPHAGEAL DILATION. Nausea/Vomiting: Controlled. Postoperative hydration reviewed and adequate. Pain:  Pain Scale 1: Numeric (0 - 10) (08/05/22 1236)  Pain Intensity 1: 0 (08/05/22 1236)   Managed. Neurological Status: At baseline. Mental Status and Level of Consciousness: Arousable. Pulmonary Status:   O2 Device: None (Room air) (08/05/22 1236)   Adequate oxygenation and airway patent. Complications related to anesthesia: None    Post-anesthesia assessment completed. No concerns. Signed By: Sheryle Aures, DO    8/5/2022  Post anesthesia nausea and vomiting:  controlled      INITIAL Post-op Vital signs:   Vitals Value Taken Time   /61 08/05/22 1236   Temp 36.4 °C (97.6 °F) 08/05/22 1216   Pulse 54 08/05/22 1237   Resp 19 08/05/22 1237   SpO2 100 % 08/05/22 1237   Vitals shown include unvalidated device data.

## 2022-08-05 NOTE — PROCEDURES
[facility]                   Endoscopic Gastroduodenoscopy Procedure Note      8/5/2022  Janeen Dee  1955  [id number]    Procedure: Endoscopic Gastroduodenoscopy --diagnostic    Indication:  Dysphagia/odynophagia, GERD     Pre-operative Diagnosis: see indication above    Post-operative Diagnosis: see findings below    : BRENDON Grey MD    Referring Provider:  Keren Sahu MD      Anesthesia/Sedation:  MAC anesthesia Propofol    Airway assessment: No airway problems anticipated    Pre-Procedural Exam:      Airway: clear, no airway problems anticipated  Heart: RRR, without gallops or rubs  Lungs: clear bilaterally without wheezes, crackles, or rhonchi  Abdomen: soft, nontender, nondistended, bowel sounds present  Mental Status: awake, alert and oriented to person, place and time       Procedure Details     After infomed consent was obtained for the procedure, with all risks and benefits of procedure explained the patient was taken to the endoscopy suite and placed in the left lateral decubitus position. Following sequential administration of sedation as per above, the endoscope was inserted into the mouth and advanced under direct vision to second portion of the duodenum. A careful inspection was made as the gastroscope was withdrawn, including a retroflexed view of the proximal stomach; findings and interventions are described below. Findings:   Esophagus:  normal mucosa upper and mid esophagus. Loose LES. Focal esophagitis at the GE junction, biopsied. No stricture or ring seen. Empiric dilation with 47 Fr mancera  Stomach: Numerous fundic gland polyps, biopsied. Mild antritis, biopsied  Duodenum: normal    Therapies:  esophageal dilation with 47 Fr mancera  biopsy of esophagus  biopsy of stomach body, antrum    Specimens: 1.  Gastric antral biopsies  2 biopsies of gastric polyps  3 biopsies of GE junction Complications:   None; patient tolerated the procedure well. EBL:  None. Impression:      Esophagitis  Empiric esophageal dilation  Gastric polyps  Antritis  Normal duodenum    Recommendations:  -Continue acid suppression. , -Await pathology. , -Consider LINX procedure    Tamia Orta MD8/5/2022

## 2022-08-05 NOTE — ANESTHESIA PREPROCEDURE EVALUATION
Relevant Problems   No relevant active problems       Anesthetic History   No history of anesthetic complications            Review of Systems / Medical History  Patient summary reviewed, nursing notes reviewed and pertinent labs reviewed    Pulmonary  Within defined limits                 Neuro/Psych         Headaches     Cardiovascular    Hypertension              Exercise tolerance: >4 METS     GI/Hepatic/Renal  Within defined limits              Endo/Other  Within defined limits           Other Findings   Comments: Anxiety  fibromyalgia           Physical Exam    Airway  Mallampati: II  TM Distance: 4 - 6 cm  Neck ROM: normal range of motion   Mouth opening: Normal     Cardiovascular  Regular rate and rhythm,  S1 and S2 normal,  no murmur, click, rub, or gallop             Dental  No notable dental hx       Pulmonary  Breath sounds clear to auscultation               Abdominal  GI exam deferred       Other Findings            Anesthetic Plan    ASA: 2  Anesthesia type: MAC and total IV anesthesia            Anesthetic plan and risks discussed with: Patient

## 2022-08-05 NOTE — DISCHARGE INSTRUCTIONS
Fátima Pelletier MD  [facility], [address]  [place], [address] [facility]  [phone]  [email or web address]    Tyson Lees  [id number]  1955    EGD DISCHARGE INSTRUCTIONS    Discomfort:  Sore throat- throat lozenges or warm salt water gargle  redness at IV site- apply warm compress to area; if redness or soreness persist- contact your physician  Gaseous discomfort- walking, belching will help relieve any discomfort  You may not operate a vehicle for 12 hours  You may not engage in an occupation involving machinery or appliances for rest of today  You may not drink alcoholic beverages for at least 12 hours  Avoid making any critical decisions for at least 24 hour  DIET  You may have anything by mouth. You may eat and drink immediately. You may resume your regular diet - however -  remember your colon is empty and a heavy meal will produce gas. Avoid these foods:  vegetables, fried / greasy foods, carbonated drinks      ACTIVITY  You may resume your normal daily activities   Spend the remainder of the day resting -  avoid any strenuous activity. CALL M.D. ANY SIGN OF   Increasing pain, nausea, vomiting  Abdominal distension (swelling)  New increased bleeding (oral or rectal)  Fever (chills)  Pain in chest area  Bloody discharge from nose or mouth  Shortness of breath    Follow-up Instructions:   Call Dr. Fátima Pelletier for any questions or problems. Telephone # [phone]  Dr. Garcia [facility] office will notify you of the biopsy results available  Within 7 to 10 days. We will call you or send a letter   Continue same medications. Should consider the LINX surgical procedure for the reflux    ENDOSCOPY FINDINGS:   Your endoscopy showed loose appearing sphincter in the esophagus; esophagitis; gastritis and gastric polyps.       DISCHARGE SUMMARY from Nurse    The following personal items collected during your admission are returned to you: Dental Appliance: Dental Appliances: None  Vision: Visual Aid: None  Hearing Aid:    Jewelry:    Clothing:    Other Valuables:    Valuables sent to safe:       Patient Education on Sedation / Analgesia Administered for Procedure      For 24 hours after general anesthesia or intravenous analgesia / sedation:  Have someone responsible help you with your care  Limit your activities  Do not drive and operate hazardous machinery  Do not make important personal, legal or business decisions  Do not drink alcoholic beverages  If you have not urinated within 8 hours after discharge, please contact your physician  Resume your medications unless otherwise instructed    For 24 hours after general anesthesia or intravenous analgesia / sedation  you may experience:  Drowsiness, dizziness, sleepiness, or confusion  Difficulty remembering or delayed reaction times  Difficulty with your balance, especially while walking, move slowly and carefully, do not make sudden position changes  Difficulty focusing or blurred vision    You may not be aware of slight changes in your behavior and/or your reaction time because of the medication used during and after your procedure. Report the following to your physician:  Excessive pain, swelling, redness or odor of or around the surgical area  Temperature over 100.5  Nausea and vomiting lasting longer than 4 hours or if unable to take medications  Any signs of decreased circulation or nerve impairment to extremity: change in color, persistent numbness, tingling, coldness or increase pain  Any questions or concerns    IF YOU REPORT TO AN EMERGENCY ROOM, DOCTOR'S OFFICE OR HOSPITAL WITHIN 24 HOURS AFTER YOUR PROCEDURE, BRING THIS SHEET AND YOUR AFTER VISIT SUMMARY WITH YOU AND GIVE IT TO THE PHYSICIAN OR NURSE ATTENDING YOU. Learning About Linx for Gastroesophageal Reflux Disease (GERD)  What is the Linx device for GERD? The Linx device is a small ring of magnetic beads.  It helps keep acid in your stomach. It's used to treat gastroesophageal reflux disease (GERD). GERD is the backward flow of stomach acid into the esophagus, the tube that carries food and liquid to your stomach. How can it help with GERD? The Linx device is placed around the base of the esophagus where it meets the stomach. The device helps keep the lower esophageal sphincter (LES) closed. That's the valve between your stomach and esophagus. It helps stop food from going back up into the esophagus. The device keeps the valve closed when you aren't eating. When you swallow, it allows the valve to open so food can pass through. This helps keep stomach acid out of the esophagus. Your doctor might suggest getting the device if other treatments haven't helped your symptoms. Some people with GERD may be helped by a device. Others may need different types of surgeries. How is it inserted? You will need a minor surgery to insert the Linx device. You will be asleep during the surgery. You will be kept comfortable and safe by your anesthesia provider. Your doctor will make a few small cuts (incisions) in your belly. The doctor will use special tools to insert the Linx device through one of the cuts and attach it around your lower esophagus. A thin, lighted tube, or scope, in another of the cuts will allow the doctor to see inside your belly. It takes about 30 minutes to an hour to place the device. You may be able to go home the same day or the next day. What are the side effects? After getting a Linx device, some people have trouble swallowing. This may last up to a few weeks. You may also have pain in the chest, vomiting, or nausea. What can you expect? You can eat your normal diet after the device is placed. You may want to take smaller bites and chew well. You may get an implant card for the device. The card will let other doctors know about your device when you visit them.  You may not be able to have an MRI scan because it can interact with the magnets. The Linx device can be removed if you have problems with it or if it doesn't help your reflux. Follow-up care is a key part of your treatment and safety. Be sure to make and go to all appointments, and call your doctor if you are having problems. It's also a good idea to know your test results and keep a list of the medicines you take. Where can you learn more? Go to http://www.gray.com/  Enter L125 in the search box to learn more about \"Learning About Linx for Gastroesophageal Reflux Disease (GERD). \"  Current as of: September 8, 2021               Content Version: 13.2  © 2006-2022 [employer]. Care instructions adapted under license by [employer] (which disclaims liability or warranty for this information). If you have questions about a medical condition or this instruction, always ask your healthcare professional. [place] [address] any warranty or liability for your use of this information. Esophagitis: Care Instructions  Your Care Instructions     Esophagitis (say \"ih-sof-uh-JY-tus\") is irritation of the esophagus, the tube that carries food from your throat to your stomach. Acid reflux is the most common cause of this condition. When you have reflux, stomach acid and juices flow upward. This can cause pain or a burning feeling in your chest. You may have a sore throat. It may be hard to swallow. Other causes of this condition include some medicines and supplements. Allergies or an infection can also cause it. Your doctor will ask about your symptoms and past health. He or she might do tests to find the cause of your symptoms. Treatment depends on what is causing the problem. Treatment might include changing your diet or taking medicine to relieve your symptoms. It might also include changing a medicine that is causing your symptoms.   If you have reflux, medicine that reduces the stomach acid helps your body heal. It might take 1 to 3 weeks to heal.  Follow-up care is a key part of your treatment and safety. Be sure to make and go to all appointments, and call your doctor if you are having problems. It's also a good idea to know your test results and keep a list of the medicines you take. How can you care for yourself at home? If you have acid reflux, your doctor may recommend that you:  Eat several small meals instead of two or three large meals. After you eat, wait 2 to 3 hours before you lie down. Avoid chocolate, mint, alcohol, and spicy foods. Don't smoke or use smokeless tobacco. Smoking can make this condition worse. If you need help quitting, talk to your doctor about stop-smoking programs and medicines. These can increase your chances of quitting for good. Raise the head of your bed 6 to 8 inches if you have symptoms at night. Lose weight if you are overweight. Take an over-the-counter antacid, such as Maalox, Mylanta, or Tums. Be careful when you take over-the-counter antacid medicines. Many of these medicines have aspirin in them. Read the label to make sure that you are not taking more than the recommended dose. Too much aspirin can be harmful. Take stronger acid reducers. Examples are famotidine (such as Pepcid) and omeprazole (such as Prilosec). If your condition is caused by infection, allergy, or other problems, use the medicine or treatments that your doctor recommends. Be safe with medicines. Take your medicines exactly as prescribed. Call your doctor if you think you are having a problem with your medicine. When should you call for help? Call your doctor now or seek immediate medical care if:    You have new or worse belly pain. You are vomiting. Watch closely for changes in your health, and be sure to contact your doctor if:    You have new or worse symptoms of reflux. You have trouble or pain swallowing. You are losing weight.      You do not get better as expected. Where can you learn more? Go to http://www.gray.com/  Enter N977 in the search box to learn more about \"Esophagitis: Care Instructions. \"  Current as of: September 8, 2021               Content Version: 13.2  © 1488-5980 [employer]. Care instructions adapted under license by [employer] (which disclaims liability or warranty for this information). If you have questions about a medical condition or this instruction, always ask your healthcare professional. [place] [address] any warranty or liability for your use of this information. Gastritis: Care Instructions  Your Care Instructions     Gastritis is a sore and upset stomach. It happens when something irritates the stomach lining. Many things can cause it. These include an infection such as the flu or something you ate or drank. Medicines or a sore on the lining of the stomach (ulcer) also can cause it. Your belly may bloat and ache. You may belch, vomit, and feel sick to your stomach. You should be able to relieve the problem by taking medicine. And it may help to change your diet. If gastritis lasts, your doctor may prescribe medicine. Follow-up care is a key part of your treatment and safety. Be sure to make and go to all appointments, and call your doctor if you are having problems. It's also a good idea to know your test results and keep a list of the medicines you take. How can you care for yourself at home? If your doctor prescribed antibiotics, take them as directed. Do not stop taking them just because you feel better. You need to take the full course of antibiotics. Be safe with medicines. If your doctor prescribed medicine to decrease stomach acid, take it as directed. Call your doctor if you think you are having a problem with your medicine.   Do not take any other medicine, including over-the-counter pain relievers, without talking to your doctor first.  If your doctor recommends over-the-counter medicine to reduce stomach acid, such as Pepcid AC (famotidine), Prilosec (omeprazole), or Tagamet HB (cimetidine) follow the directions on the label. Drink plenty of fluids to prevent dehydration. Choose water and other clear liquids. If you have kidney, heart, or liver disease and have to limit fluids, talk with your doctor before you increase the amount of fluids you drink. Avoid foods that make your symptoms worse. These may include chocolate, mint, alcohol, pepper, spicy foods, high-fat foods, or drinks with caffeine in them, such as tea, coffee, christie, or energy drinks. If your symptoms are worse after you eat a certain food, you may want to stop eating it to see if your symptoms get better. When should you call for help? Call 911 anytime you think you may need emergency care. For example, call if:    You vomit blood or what looks like coffee grounds. You pass maroon or very bloody stools. Call your doctor now or seek immediate medical care if:    You start breathing fast and have not produced urine in the last 8 hours. You cannot keep fluids down. Watch closely for changes in your health, and be sure to contact your doctor if:    You do not get better as expected. Where can you learn more? Go to http://aditya-christine.info/  Enter Z536 in the search box to learn more about \"Gastritis: Care Instructions. \"  Current as of: September 8, 2021               Content Version: 13.2  © 2006-2022 [employer]. Care instructions adapted under license by [email or web address] (which disclaims liability or warranty for this information). If you have questions about a medical condition or this instruction, always ask your healthcare professional. [place] [address] any warranty or liability for your use of this information.

## 2022-08-05 NOTE — ADDENDUM NOTE
Addendum  created 08/05/22 1520 by DO Rafael Munoz [place] [address] recorded in [facility], [facility] 97 filed, [employer] [employer] edited

## 2022-08-05 NOTE — ROUTINE PROCESS
[facility]on [facility]  1955  [id number]    Situation:  Verbal report received from: Aroldo Fletcher  Procedure: Procedure(s):  ESOPHAGOGASTRODUODENOSCOPY (EGD)  ESOPHAGOGASTRODUODENAL (EGD) BIOPSY  ESOPHAGEAL DILATION    Background:    Preoperative diagnosis: Epigastric pain [R10.13]  Dysphagia, unspecified type [R13.10]  Gastroesophageal reflux disease, unspecified whether esophagitis present [K21.9]  Postoperative diagnosis: Esophagitis, gastric polyps, gastritis, dysphagia     :  Dr. [facility]  Assistant(s): Endoscopy Technician-1: Fan Pérez  Endoscopy RN-1: Samina Todd    Specimens:   ID Type Source Tests Collected by Time Destination   1 : Amie Kay Mabel Henderson., MD 8/5/2022 1159 Pathology   2 : Gastric Polyp Amanda Arriaga MD 8/5/2022 1200 Pathology   3 : Selam Mcfarland MD 8/5/2022 1202 Pathology     H. Pylori  no    Assessment:  Intra-procedure medications       Anesthesia gave intra-procedure sedation and medications, see anesthesia flow sheet yes    Intravenous fluids: NS@ KVO     Vital signs stable     Abdominal assessment: round and soft     No subcutaneous crepitus of the chest or cervical region was noted post procedure. Recommendation:  Discharge patient per MD order.   R  Family   Permission to share finding with family or friend yes

## 2022-09-07 ENCOUNTER — OFFICE VISIT (OUTPATIENT)
Dept: NEUROLOGY | Age: 67
End: 2022-09-07
Payer: MEDICARE

## 2022-09-07 VITALS
RESPIRATION RATE: 18 BRPM | WEIGHT: 185 LBS | HEART RATE: 68 BPM | OXYGEN SATURATION: 97 % | DIASTOLIC BLOOD PRESSURE: 72 MMHG | BODY MASS INDEX: 29.03 KG/M2 | HEIGHT: 67 IN | SYSTOLIC BLOOD PRESSURE: 132 MMHG

## 2022-09-07 DIAGNOSIS — M54.16 LUMBAR RADICULOPATHY: ICD-10-CM

## 2022-09-07 DIAGNOSIS — R26.9 GAIT DISORDER: ICD-10-CM

## 2022-09-07 DIAGNOSIS — M54.42 ACUTE LEFT-SIDED LOW BACK PAIN WITH LEFT-SIDED SCIATICA: ICD-10-CM

## 2022-09-07 DIAGNOSIS — R42 DIZZINESS: Primary | ICD-10-CM

## 2022-09-07 PROCEDURE — G8510 SCR DEP NEG, NO PLAN REQD: HCPCS | Performed by: PSYCHIATRY & NEUROLOGY

## 2022-09-07 PROCEDURE — 1124F ACP DISCUSS-NO DSCNMKR DOCD: CPT | Performed by: PSYCHIATRY & NEUROLOGY

## 2022-09-07 PROCEDURE — G8536 NO DOC ELDER MAL SCRN: HCPCS | Performed by: PSYCHIATRY & NEUROLOGY

## 2022-09-07 PROCEDURE — 1101F PT FALLS ASSESS-DOCD LE1/YR: CPT | Performed by: PSYCHIATRY & NEUROLOGY

## 2022-09-07 PROCEDURE — G8400 PT W/DXA NO RESULTS DOC: HCPCS | Performed by: PSYCHIATRY & NEUROLOGY

## 2022-09-07 PROCEDURE — G8427 DOCREV CUR MEDS BY ELIG CLIN: HCPCS | Performed by: PSYCHIATRY & NEUROLOGY

## 2022-09-07 PROCEDURE — 3017F COLORECTAL CA SCREEN DOC REV: CPT | Performed by: PSYCHIATRY & NEUROLOGY

## 2022-09-07 PROCEDURE — G8417 CALC BMI ABV UP PARAM F/U: HCPCS | Performed by: PSYCHIATRY & NEUROLOGY

## 2022-09-07 PROCEDURE — 99205 OFFICE O/P NEW HI 60 MIN: CPT | Performed by: PSYCHIATRY & NEUROLOGY

## 2022-09-07 PROCEDURE — 1090F PRES/ABSN URINE INCON ASSESS: CPT | Performed by: PSYCHIATRY & NEUROLOGY

## 2022-09-07 RX ORDER — DEXAMETHASONE 2 MG/1
TABLET ORAL
Qty: 30 TABLET | Refills: 0 | Status: SHIPPED | OUTPATIENT
Start: 2022-09-07 | End: 2022-09-30 | Stop reason: ALTCHOICE
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
## 2022-09-24 ENCOUNTER — HOSPITAL ENCOUNTER (OUTPATIENT)
Dept: CT IMAGING | Age: 67
Discharge: HOME | End: 2022-09-24
Attending: PSYCHIATRY & NEUROLOGY
Payer: MEDICARE

## 2022-09-24 ENCOUNTER — HOSPITAL ENCOUNTER (OUTPATIENT)
Dept: MRI IMAGING | Age: 67
Discharge: HOME | End: 2022-09-24
Attending: PSYCHIATRY & NEUROLOGY
Payer: MEDICARE

## 2022-09-24 DIAGNOSIS — M54.16 LUMBAR RADICULOPATHY: ICD-10-CM

## 2022-09-24 DIAGNOSIS — R26.9 GAIT DISORDER: ICD-10-CM

## 2022-09-24 DIAGNOSIS — R42 DIZZINESS: ICD-10-CM

## 2022-09-24 DIAGNOSIS — M54.42 ACUTE LEFT-SIDED LOW BACK PAIN WITH LEFT-SIDED SCIATICA: ICD-10-CM

## 2022-09-24 PROCEDURE — 70450 CT HEAD/BRAIN W/O DYE: CPT

## 2022-09-24 PROCEDURE — 72148 MRI LUMBAR SPINE W/O DYE: CPT

## 2022-09-30 ENCOUNTER — OFFICE VISIT (OUTPATIENT)
Dept: NEUROLOGY | Age: 67
End: 2022-09-30
Payer: MEDICARE

## 2022-09-30 VITALS
WEIGHT: 184.8 LBS | RESPIRATION RATE: 15 BRPM | HEIGHT: 67 IN | HEART RATE: 49 BPM | BODY MASS INDEX: 29 KG/M2 | SYSTOLIC BLOOD PRESSURE: 120 MMHG | OXYGEN SATURATION: 100 % | DIASTOLIC BLOOD PRESSURE: 88 MMHG | TEMPERATURE: 97.2 F

## 2022-09-30 DIAGNOSIS — M54.50 BILATERAL LOW BACK PAIN WITHOUT SCIATICA, UNSPECIFIED CHRONICITY: ICD-10-CM

## 2022-09-30 DIAGNOSIS — M47.816 LUMBAR ARTHROPATHY: Primary | ICD-10-CM

## 2022-09-30 DIAGNOSIS — R26.9 GAIT DISORDER: ICD-10-CM

## 2022-09-30 DIAGNOSIS — G62.9 NEUROPATHY: ICD-10-CM

## 2022-09-30 PROCEDURE — G8427 DOCREV CUR MEDS BY ELIG CLIN: HCPCS | Performed by: PSYCHIATRY & NEUROLOGY

## 2022-09-30 PROCEDURE — G8400 PT W/DXA NO RESULTS DOC: HCPCS | Performed by: PSYCHIATRY & NEUROLOGY

## 2022-09-30 PROCEDURE — 1090F PRES/ABSN URINE INCON ASSESS: CPT | Performed by: PSYCHIATRY & NEUROLOGY

## 2022-09-30 PROCEDURE — 1101F PT FALLS ASSESS-DOCD LE1/YR: CPT | Performed by: PSYCHIATRY & NEUROLOGY

## 2022-09-30 PROCEDURE — 3017F COLORECTAL CA SCREEN DOC REV: CPT | Performed by: PSYCHIATRY & NEUROLOGY

## 2022-09-30 PROCEDURE — G8536 NO DOC ELDER MAL SCRN: HCPCS | Performed by: PSYCHIATRY & NEUROLOGY

## 2022-09-30 PROCEDURE — 99214 OFFICE O/P EST MOD 30 MIN: CPT | Performed by: PSYCHIATRY & NEUROLOGY

## 2022-09-30 PROCEDURE — G8510 SCR DEP NEG, NO PLAN REQD: HCPCS | Performed by: PSYCHIATRY & NEUROLOGY

## 2022-09-30 PROCEDURE — G8417 CALC BMI ABV UP PARAM F/U: HCPCS | Performed by: PSYCHIATRY & NEUROLOGY

## 2022-09-30 PROCEDURE — 1124F ACP DISCUSS-NO DSCNMKR DOCD: CPT | Performed by: PSYCHIATRY & NEUROLOGY

## 2022-09-30 RX ORDER — DICLOFENAC SODIUM 10 MG/G
GEL TOPICAL
COMMUNITY
Start: 2022-09-14

## 2022-09-30 RX ORDER — METOPROLOL SUCCINATE 100 MG/1
100 TABLET, EXTENDED RELEASE ORAL DAILY
COMMUNITY

## 2022-09-30 RX ORDER — FAMOTIDINE 20 MG/1
20 TABLET, FILM COATED ORAL DAILY
COMMUNITY

## 2022-09-30 RX ORDER — GLUCOSAMINE SULFATE 1500 MG
POWDER IN PACKET (EA) ORAL DAILY
COMMUNITY

## 2022-09-30 RX ORDER — FOLIC ACID 1 MG/1
1 TABLET ORAL DAILY
Qty: 30 TABLET | Refills: 5 | Status: SHIPPED | OUTPATIENT
Start: 2022-09-30

## 2022-09-30 RX ORDER — MULTIVIT WITH MINERALS/HERBS
1 TABLET ORAL DAILY
COMMUNITY
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
## 2022-09-30 NOTE — PROGRESS NOTES
1. Have you been to the ER, urgent care clinic since your last visit? Hospitalized since your last visit? No.    2. Have you seen or consulted any other health care providers outside of the [address] [place] [address] since your last visit? Include any pap smears or colon screening.    No.        Chief Complaint   Patient presents with    Results     Discuss MRI and CT scan results
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
## 2023-02-07 ENCOUNTER — TELEPHONE (OUTPATIENT)
Dept: NEUROLOGY | Age: 68
End: 2023-02-07

## 2023-02-07 NOTE — TELEPHONE ENCOUNTER
Spoke with patient. Verified patient with two patient identifiers. States she takes Dexamethazone for her GERD. Advised Dr. Shelia Padilla would be the one to fill meds for GERD. Sent refill request back to [facility] to send to him. Patient verbalized understanding.

## 2023-02-09 RX ORDER — FOLIC ACID 1 MG/1
1 TABLET ORAL DAILY
Qty: 30 TABLET | Refills: 1 | Status: SHIPPED | OUTPATIENT
Start: 2023-02-09

## 2023-03-30 ENCOUNTER — OFFICE VISIT (OUTPATIENT)
Dept: NEUROLOGY | Age: 68
End: 2023-03-30
Payer: MEDICARE

## 2023-03-30 VITALS
DIASTOLIC BLOOD PRESSURE: 84 MMHG | BODY MASS INDEX: 28.88 KG/M2 | HEART RATE: 59 BPM | SYSTOLIC BLOOD PRESSURE: 128 MMHG | OXYGEN SATURATION: 98 % | WEIGHT: 184 LBS | RESPIRATION RATE: 15 BRPM | HEIGHT: 67 IN

## 2023-03-30 DIAGNOSIS — M47.816 LUMBAR ARTHROPATHY: Primary | ICD-10-CM

## 2023-03-30 PROCEDURE — 3017F COLORECTAL CA SCREEN DOC REV: CPT | Performed by: INTERNAL MEDICINE

## 2023-03-30 PROCEDURE — 1090F PRES/ABSN URINE INCON ASSESS: CPT | Performed by: INTERNAL MEDICINE

## 2023-03-30 PROCEDURE — 99215 OFFICE O/P EST HI 40 MIN: CPT | Performed by: INTERNAL MEDICINE

## 2023-03-30 PROCEDURE — G8510 SCR DEP NEG, NO PLAN REQD: HCPCS | Performed by: INTERNAL MEDICINE

## 2023-03-30 PROCEDURE — 1101F PT FALLS ASSESS-DOCD LE1/YR: CPT | Performed by: INTERNAL MEDICINE

## 2023-03-30 PROCEDURE — G8400 PT W/DXA NO RESULTS DOC: HCPCS | Performed by: INTERNAL MEDICINE

## 2023-03-30 PROCEDURE — 1124F ACP DISCUSS-NO DSCNMKR DOCD: CPT | Performed by: INTERNAL MEDICINE

## 2023-03-30 PROCEDURE — G8417 CALC BMI ABV UP PARAM F/U: HCPCS | Performed by: INTERNAL MEDICINE

## 2023-03-30 PROCEDURE — G8536 NO DOC ELDER MAL SCRN: HCPCS | Performed by: INTERNAL MEDICINE

## 2023-03-30 PROCEDURE — G8428 CUR MEDS NOT DOCUMENT: HCPCS | Performed by: INTERNAL MEDICINE

## 2023-03-30 RX ORDER — LIDOCAINE 50 MG/G
1 PATCH TOPICAL EVERY 24 HOURS
Qty: 10 EACH | Refills: 5 | Status: SHIPPED | OUTPATIENT
Start: 2023-03-30

## 2023-03-30 RX ORDER — GABAPENTIN 600 MG/1
600 TABLET ORAL 2 TIMES DAILY
Qty: 60 TABLET | Refills: 6 | Status: SHIPPED | OUTPATIENT
Start: 2023-03-30

## 2023-03-30 NOTE — PROGRESS NOTES
1. Have you been to the ER, urgent care clinic since your last visit? Hospitalized since your last visit? No.    2. Have you seen or consulted any other health care providers outside of the [address] [place] [address] since your last visit? Include any pap smears or colon screening.    No.        Chief Complaint   Patient presents with    Neuropathy     6 month- toes on both feet, tingling, burning- index finger on rt hand is sensitive, rt side hip pain- unsteady on feet- off balance

## 2023-03-30 NOTE — PROGRESS NOTES
Neurology Note    Patient ID:  Antione Zhang  [id number]  89 y.o.  1955      Date of Consultation:  March 30, 2023      Assessment and Plan:  52-year-old female presenting with low back pain primarily on the right side with MRI showing bilateral neuroforaminal stenosis mild along L5-S1, L3-L4. Lumbosacral radiculopathy would recommend continuing gabapentin however at a slightly higher dose 600 mg twice daily. I did tell her that she could have an additional 600 mg dose for flares. I also will prescribe lidocaine patch to help with some of the low back pain. She should continue physical therapy as this has been helpful for her in the past.    Recommendations:  - Lidocaine patch for low back pain  - Increase gabapentin to 600 mg twice daily, may take an additional 600 mg dose for flares  - Physical therapy      Problem was discussed at length with the patient. We reviewed the results of the study in detail. We also discussed prognosis and treatment options. The patient had opportunity today to ask all questions, expressed understanding of the instructions provided, and agreed with the plan of treatment. Follow up in 6 to 8 months. I spent 60 minutes providing care to this patient, reviewing the patient's chart, notes, labs, medications and preparing documentation along with >50% of the time spent counseling patient during the encounter. History of Present Illness:   Antione Zhang is a 78 y.o. female with history of anxiety, headaches, hypertension, fibromyalgia, lumbosacral radiculopathy presenting as a follow-up. The patient was a former patient of Dr. Aroldo Ledesma. The patient states that she has low back pain which radiates bilaterally however recently the right side has been more painful. She describes the pain as a shooting sharp pain down the right side of her hip and down her right leg. Typically stops around her foot.   She does have some gabapentin at home which she takes 300 mg twice daily. She has no side effects from this dose. She has had an MRI which showed mild bilateral neuroforaminal stenosis at L5-S1 and L3-L4. She has been doing physical therapy and she has noticed that she sees much improvement with the therapy. No new or acute neurological symptoms today. Past Medical History:   Diagnosis Date    Acid indigestion     Anxiety     Gastrointestinal disorder     acid reflux    Headache     Hypertension     Other ill-defined conditions(799.89)     fibromyalgia        Past Surgical History:   Procedure Laterality Date    HX GYN      , tubal ligation    IN ERCP REMOVE CALCULI/DEBRIS BILIARY/PANCREAS DUCT  2017         IN ERCP W/SPHINCTEROTOMY/PAPILLOTOMY  2017         IN ESOPHAGEAL MOTILITY STUDY W/INTERP&RPT  2016         IN ESOPHGL FUNCJ G-ESOP RFLX IMPD ELTRD PROLNG  2016         UPPER GI ENDOSCOPY,BIOPSY  2017             Family History   Problem Relation Age of Onset    Cancer Mother 52        breast, colon    Heart Disease Mother     Hypertension Mother     Diabetes Mother     Cancer Father         spinal    Hypertension Father     Hypertension Brother     Diabetes Maternal Aunt         Social History     Tobacco Use    Smoking status: Never    Smokeless tobacco: Never   Substance Use Topics    Alcohol use: No        Allergies   Allergen Reactions    Pcn [Penicillins] Hives     Has had rocephin and ancef without reaction. Prior to Admission medications    Medication Sig Start Date End Date Taking? Authorizing Provider   gabapentin (NEURONTIN) 600 mg tablet Take 1 Tablet by mouth two (2) times a day. Max Daily Amount: 1,200 mg. 3/30/23  Yes Marzena Griffin, DO   lidocaine (LIDODERM) 5 % 1 Patch by TransDERmal route every twenty-four (24) hours. Apply patch to the affected area for 12 hours a day and remove for 12 hours a day.  3/30/23  Yes Marzena Griffin, DO   folic acid (FOLVITE) 1 mg tablet Take 1 Tablet by mouth daily. 2/9/23  Yes Marzena Griffin,    diclofenac (VOLTAREN) 1 % gel APPLY 1 GRAM TOPICALLY 4 TIMES A DAY AS NEEDED FOR PAIN 9/14/22  Yes Provider, Historical   famotidine (PEPCID) 20 mg tablet Take 20 mg by mouth daily. Yes Provider, Historical   metoprolol succinate (TOPROL-XL) 100 mg tablet Take 100 mg by mouth daily. Yes Provider, Historical   b complex vitamins tablet Take 1 Tablet by mouth daily. Yes Provider, Historical   cholecalciferol (VITAMIN D3) 25 mcg (1,000 unit) cap Take  by mouth daily. Yes Provider, Historical   dexlansoprazole (DEXILANT) 60 mg CpDB capsule (delayed release) Take  by mouth daily. Yes Provider, Historical   ibuprofen (MOTRIN) 600 mg tablet Take 1 Tab by mouth every six (6) hours as needed for Pain. 1/20/17  Yes Carole Naranjo MD   irbesartan (AVAPRO) 300 mg tablet Take 300 mg by mouth daily. Yes Provider, Historical   oxyCODONE-acetaminophen (PERCOCET) 5-325 mg per tablet Take 1-2 Tabs by mouth every four (4) hours as needed for Pain. Max Daily Amount: 12 Tabs.   Patient not taking: No sig reported 1/20/17   Carole Naranjo MD       Review of Systems:    General, constitutional: negative  Eyes, vision: negative  Ears, nose, throat: negative  Cardiovascular, heart: negative  Respiratory: negative  Gastrointestinal: negative  Genitourinary: negative  Musculoskeletal: negative  Skin and integumentary: negative  Psychiatric: negative  Endocrine: negative  Neurological: negative, except for HPI  Hematologic/lymphatic: negative  Allergy/immunology: negative    []Unable to obtain  ROS due to  []mental status change  []sedated   []intubated    Objective:     Visit Vitals  /84 (BP 1 Location: Left upper arm, BP Patient Position: Sitting, BP Cuff Size: Adult)   Pulse (!) 59   Resp 15   Ht 5' 7\" (1.702 m)   Wt 184 lb (83.5 kg)   SpO2 98%   BMI 28.82 kg/m²       Physical Exam:  General:  appears well nourished in no acute distress  Neck: no obvious deformity or masses  Lungs: comfortable on room air  Heart:  well-perfused   Lower extremity: no edema  Skin: intact    Neurological exam:  Awake, alert, oriented to person, place and time  Recent and remote memory were normal  Attention and concentration were intact  Language was intact. There was no aphasia  Speech: no dysarthria  Fund of knowledge was preserved    Cranial nerves:   II-XII were tested    PERRRLA  Visual fields were full to finger counting   EOMI, no evidence of nystagmus  Facial sensation:  normal and symmetric  Facial motor: normal and symmetric  Hearing intact  SCM strength intact  Tongue: midline without fasciculations    Motor: Tone normal in upper and lower extremities     Strength testing:   deltoid triceps biceps Wrist ext. Wrist flex. intrinsics Hip flex. Hip ext. Knee ext. Knee flex Dorsi flex Plantar flex   Right 5 5 5 5 5 5 5 NT 5 5 5 5   Left 5 5 5 5 5 5 5 NT 5 5 5 5       Sensory:  Intact to LT throughout     Reflexes:    Right Left  Biceps  2 2  Triceps  2 2  Brachiorad. 2 2  Patella  2 2  Achilles 1 1    Cerebellar testing:  no tremor apparent, finger/nose and rapid alternating movements were intact    Gait: steady.      Labs:     Lab Results   Component Value Date/Time    Hemoglobin A1c 5.6 01/14/2017 04:13 AM    Sodium 141 01/19/2017 03:49 AM    Potassium 4.3 01/19/2017 03:49 AM    Chloride 106 01/19/2017 03:49 AM    Glucose 95 01/19/2017 03:49 AM    BUN 13 01/19/2017 03:49 AM    Creatinine 0.91 01/19/2017 03:49 AM    Calcium 8.2 (L) 01/19/2017 03:49 AM    WBC 8.6 01/19/2017 03:49 AM    HCT 32.2 (L) 01/19/2017 03:49 AM    HGB 11.0 (L) 01/19/2017 03:49 AM    PLATELET 861 92/83/8950 03:49 AM       Imaging:    Results from Hospital Encounter encounter on 09/24/22    MRI LUMB SPINE WO CONT    Narrative  EXAM: MRI LUMB SPINE WO CONT    INDICATION: . Lumbago with sciatica, left side    COMPARISON: None    TECHNIQUE: MR imaging of the lumbar spine was performed using the following  sequences: sagittal T1, T2, STIR;  axial T1, T2.    CONTRAST:  None. FINDINGS:    There is normal alignment of the lumbar spine. Vertebral body heights are  maintained. Marrow signal is normal.    The conus medullaris terminates at T12. Signal and caliber of the distal spinal  cord are within normal limits. The paraspinal soft tissues are within normal limits. Lower thoracic spine: No herniation or stenosis. L1-L2: No herniation or stenosis. L2-L3: No herniation or central stenosis. Mild disc bulge with minimal bilateral  neural foraminal stenosis (series 7, image 8). L3-L4: No herniation or central stenosis. Mild disc bulge with minimal bilateral  neural foraminal stenosis (series 7, image 13). L4-L5: No herniation or central stenosis. Mild disc bulge with moderate  bilateral facet arthropathy results in mild bilateral neural foraminal stenosis. (series 7, image 18). L5-S1: Disc desiccation. Bilateral facet arthropathy. No significant neural  foraminal stenosis. Mild bilateral neural foraminal stenosis (series 7, image  23). Impression  No significant central canal or neural foraminal stenosis. Results from [place] encounter on 09/24/22    CT HEAD WO CONT    Narrative  EXAM: CT HEAD WO CONT    INDICATION: Dizziness and giddiness    COMPARISON: None. CONTRAST: None. TECHNIQUE: Unenhanced CT of the head was performed using 5 mm images. Brain and  bone windows were generated. Coronal and sagittal reformats. CT dose reduction  was achieved through use of a standardized protocol tailored for this  examination and automatic exposure control for dose modulation. FINDINGS:  The ventricles and sulci are normal in size, shape and configuration. . There is  no significant white matter disease. There is no intracranial hemorrhage,  extra-axial collection, or mass effect. The basilar cisterns are open. No CT  evidence of acute infarct. The bone windows demonstrate no abnormalities.  The visualized portions of the  paranasal sinuses and mastoid air cells are clear. Impression  No acute abnormality.              Patient Active Problem List   Diagnosis Code    UTI (urinary tract infection) N39.0    Acid indigestion K30                   Signed By:   Edith Dee DO  Neurophysiology      March 30, 2023

## 2023-04-14 ENCOUNTER — TELEPHONE (OUTPATIENT)
Dept: NEUROLOGY | Age: 68
End: 2023-04-14

## 2023-04-19 RX ORDER — FOLIC ACID 1 MG/1
1 TABLET ORAL DAILY
Qty: 30 TABLET | Refills: 5 | Status: SHIPPED | OUTPATIENT
Start: 2023-04-19

## 2023-04-19 NOTE — TELEPHONE ENCOUNTER
Pt called for refill of:    Requested Prescriptions     Pending Prescriptions Disp Refills    folic acid (FOLVITE) 1 mg tablet 30 Tablet 1     Sig: Take 1 Tablet by mouth daily.      [facility] [phone]

## 2023-04-21 DIAGNOSIS — R10.13 EPIGASTRIC PAIN: Primary | ICD-10-CM

## 2023-04-22 DIAGNOSIS — R10.13 EPIGASTRIC PAIN: Primary | ICD-10-CM

## 2023-05-09 ENCOUNTER — TELEPHONE (OUTPATIENT)
Age: 68
End: 2023-05-09

## 2023-05-09 NOTE — TELEPHONE ENCOUNTER
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 Patient states when she bends a certain way,she will topple over. She does rate her neuropathy pain at night a 9-10. She is taking the gabapentin 600 mg BID. Did start PT however had to stop due to father being hospitalized. Patient states she will go back to PT. She does want to know however what she should be doing to prevent neuropathy getting worse? What kind of foods she should be eating?  Please advise

## 2023-05-09 NOTE — TELEPHONE ENCOUNTER
Attempted to contact you by phone, however, unsuccessful. No answer and VM has not been set up yet. Returning her call. Will try back later.

## 2023-05-10 NOTE — TELEPHONE ENCOUNTER
Verified patient with 2 identifiers   Advised patient of Dr Cristiana Leblanc recommendations.  Also advised to continue normal activities and encouraged exercises

## 2023-07-11 ENCOUNTER — TELEPHONE (OUTPATIENT)
Age: 68
End: 2023-07-11

## 2023-07-11 NOTE — TELEPHONE ENCOUNTER
Pt would like a call back to discuss if there is something she can do for her symptoms. She states she is itching all over and feels like she has needles and pins all over, not just in her feet. Also pain in lower back. Please call.

## 2023-07-11 NOTE — TELEPHONE ENCOUNTER
Returned call,verified pt with two pt identifiers, pt advised last night she started itching all over, pins and needles sensation all over her body. It would come in waves and lasted for 3 hours. She took Tylenol last night and it helped her to rest. She notes no rash but itching herself so much last night that she had hives. She notes no different soaps or detergents that could cause this. No trouble breathing or throat tightening. No different medications. She did note her back hurting last night and used the heating pad. She had PT today and they upped her weights and workout and now she is starting to have the sensation in her shoulder, hands, feet and calves. I advised her to take the Tylenol again to help so it does not get as bad as last night. She then wanted to know what her exact Dx is so she can look it up. She also wants to know if this sensation could be coming from her back. She also wants to know if her Dx could be affecting her hands. Advised pt not sure if the sensation is coming from her back or something she has come in contact with. I advised I will need to send the message to [facility] to advise on. Pt verbalized understanding. Spent 15 mins on phone with pt.

## 2023-07-13 NOTE — TELEPHONE ENCOUNTER
Message  Received: DO Erin Laughlin LPN  Caller: Unspecified (2 days ago, 10:49 AM)  It sounds like an allergic reaction to something, she needs to be evaluated in the ER if it is very bothersome to her and gets worse.

## 2023-07-14 NOTE — TELEPHONE ENCOUNTER
Pt returned call to Nurse. Advised her what the Dx is and Pt wrote that in her notes. Also advised that if it gets worse, Dr Hiro Scherer advised to go to ER as she believes it could be an allergic reaction to something. Pt verbalized understanding.    [phone]

## 2023-07-14 NOTE — TELEPHONE ENCOUNTER
You  Andra Strickland, DO 19 hours ago (5:13 PM)       She is going to ask her Dx also. What is her exact Dx? Thanks. Message  Received: Today  Greg Ling, [address] [facility], [facility]  Caller: Unspecified (3 days ago, 10:49 AM)  Lumbosacral radiculopathy           Left voice message to call me back.

## 2023-09-01 RX ORDER — FOLIC ACID 1 MG/1
1 TABLET ORAL DAILY
Qty: 30 TABLET | Refills: 5 | Status: SHIPPED | OUTPATIENT
Start: 2023-09-01

## 2023-09-01 NOTE — TELEPHONE ENCOUNTER
Received fax from [facility] requesting refill on folic acid.  Last OV was 3/30/23  Last filled on 2/29/23 # 30 with 5 refills

## 2023-09-28 DIAGNOSIS — M47.816 SPONDYLOSIS WITHOUT MYELOPATHY OR RADICULOPATHY, LUMBAR REGION: Primary | ICD-10-CM

## 2023-09-28 NOTE — TELEPHONE ENCOUNTER
Please call in refill for gabapentin. Pt states soul be 600 mg twice a day.       [id number] [address] [facility], [address] [place] [address] [phone] - f [phone]

## 2023-10-02 RX ORDER — GABAPENTIN 100 MG/1
100 CAPSULE ORAL 3 TIMES DAILY
Qty: 90 CAPSULE | Refills: 0 | Status: SHIPPED | OUTPATIENT
Start: 2023-10-02 | End: 2024-10-03

## 2023-10-09 ENCOUNTER — TELEMEDICINE (OUTPATIENT)
Age: 68
End: 2023-10-09

## 2023-10-09 DIAGNOSIS — R20.2 NUMBNESS AND TINGLING OF BOTH FEET: ICD-10-CM

## 2023-10-09 DIAGNOSIS — R20.0 NUMBNESS AND TINGLING OF BOTH FEET: ICD-10-CM

## 2023-10-09 DIAGNOSIS — E53.8 VITAMIN B 12 DEFICIENCY: ICD-10-CM

## 2023-10-09 DIAGNOSIS — M48.062 SPINAL STENOSIS OF LUMBAR REGION WITH NEUROGENIC CLAUDICATION: Primary | ICD-10-CM

## 2023-10-09 RX ORDER — GABAPENTIN 600 MG/1
600 TABLET ORAL 2 TIMES DAILY
Qty: 60 TABLET | Refills: 3 | Status: SHIPPED | OUTPATIENT
Start: 2023-10-09 | End: 2024-10-16

## 2023-10-09 RX ORDER — GABAPENTIN 300 MG/1
600 CAPSULE ORAL 2 TIMES DAILY
COMMUNITY
End: 2023-10-09

## 2023-10-09 ASSESSMENT — PATIENT HEALTH QUESTIONNAIRE - PHQ9
SUM OF ALL RESPONSES TO PHQ9 QUESTIONS 1 & 2: 0
1. LITTLE INTEREST OR PLEASURE IN DOING THINGS: 0
SUM OF ALL RESPONSES TO PHQ QUESTIONS 1-9: 0
2. FEELING DOWN, DEPRESSED OR HOPELESS: 0
SUM OF ALL RESPONSES TO PHQ QUESTIONS 1-9: 0

## 2023-10-09 NOTE — PROGRESS NOTES
Chief Complaint   Patient presents with    Peripheral Neuropathy     Tingling in legs and feet and she is also having pins and needles in her torso and hands. Other     Previously was going to PT and did well with physical therapy. Worked with unsteady gait and strength with core.  passed July 31st and she stopped going. Would you like to resume PT. Patient would like new prescription for Gabapentin. She states that she is on 600 mg BID. She states that she needs a refill.     Call Patient Cell [phone]
therapy     Problem was discussed at length with the patient. We reviewed the results of the study in detail. We also discussed prognosis and treatment options. The patient had opportunity today to ask all questions, expressed understanding of the instructions provided, and agreed with the plan of treatment. Follow up in 6 to 8 months. Past Medical History:   Diagnosis Date    Acid indigestion     Anxiety     Gastrointestinal disorder     acid reflux    Headache     Hypertension     Other ill-defined conditions(799.89)     fibromyalgia        Past Surgical History:   Procedure Laterality Date    ERCP REMOVE CALCULI/DEBRIS BILIARY/PANCREAS DUCT  2017         ERCP W/SPHINCTEROTOMY/PAPILLOTOMY  2017         ESOPHAGEAL MOTILITY STUDY W/INTERP&RPT  2016         GERD TST W/ MUCOS IMPEDE ELECTROD,>1HR  2016         GYN      , tubal ligation    UPPER GI ENDOSCOPY,BIOPSY  2017             Family History   Problem Relation Age of Onset    Hypertension Mother     Diabetes Maternal Aunt     Hypertension Brother     Hypertension Father     Cancer Father         spinal    Cancer Mother 52        breast, colon    Heart Disease Mother     Diabetes Mother         Social History     Tobacco Use    Smoking status: Never    Smokeless tobacco: Never   Substance Use Topics    Alcohol use: No        Allergies   Allergen Reactions    Penicillins Hives     Has had rocephin and ancef without reaction. Prior to Admission medications    Medication Sig Start Date End Date Taking? Authorizing Provider   gabapentin (NEURONTIN) 600 MG tablet Take 1 tablet by mouth 2 times daily.  Max Daily Amount: 1,200 mg 10/9/23 10/16/24 Yes Neena Cardoso APRN - NP   folic acid (FOLVITE) 1 MG tablet Take 1 tablet by mouth daily 23  Yes Katherine Krishnan DO   dexlansoprazole (DEXILANT) 60 MG CPDR delayed release capsule Take by mouth daily   Yes Automatic Reconciliation, Ar   famotidine (PEPCID) 20 MG tablet

## 2023-10-13 LAB
ALBUMIN SERPL-MCNC: 4.6 G/DL (ref 3.9–4.9)
ALBUMIN/GLOB SERPL: 2 {RATIO} (ref 1.2–2.2)
ALP SERPL-CCNC: 124 IU/L (ref 44–121)
ALT SERPL-CCNC: 18 IU/L (ref 0–32)
AST SERPL-CCNC: 16 IU/L (ref 0–40)
BASOPHILS # BLD AUTO: 0.1 X10E3/UL (ref 0–0.2)
BASOPHILS NFR BLD AUTO: 1 %
BILIRUB SERPL-MCNC: 0.7 MG/DL (ref 0–1.2)
BUN SERPL-MCNC: 10 MG/DL (ref 8–27)
BUN/CREAT SERPL: 10 (ref 12–28)
CALCIUM SERPL-MCNC: 9.1 MG/DL (ref 8.7–10.3)
CHLORIDE SERPL-SCNC: 103 MMOL/L (ref 96–106)
CO2 SERPL-SCNC: 26 MMOL/L (ref 20–29)
CREAT SERPL-MCNC: 0.98 MG/DL (ref 0.57–1)
EGFRCR SERPLBLD CKD-EPI 2021: 63 ML/MIN/1.73
EOSINOPHIL # BLD AUTO: 0.1 X10E3/UL (ref 0–0.4)
EOSINOPHIL NFR BLD AUTO: 2 %
ERYTHROCYTE [DISTWIDTH] IN BLOOD BY AUTOMATED COUNT: 13.4 % (ref 11.7–15.4)
GLOBULIN SER CALC-MCNC: 2.3 G/DL (ref 1.5–4.5)
GLUCOSE SERPL-MCNC: 96 MG/DL (ref 70–99)
HBA1C MFR BLD: 5.9 % (ref 4.8–5.6)
HCT VFR BLD AUTO: 35.3 % (ref 34–46.6)
HGB BLD-MCNC: 12.2 G/DL (ref 11.1–15.9)
IMM GRANULOCYTES # BLD AUTO: 0 X10E3/UL (ref 0–0.1)
IMM GRANULOCYTES NFR BLD AUTO: 0 %
LYMPHOCYTES # BLD AUTO: 2 X10E3/UL (ref 0.7–3.1)
LYMPHOCYTES NFR BLD AUTO: 33 %
MCH RBC QN AUTO: 31.3 PG (ref 26.6–33)
MCHC RBC AUTO-ENTMCNC: 34.6 G/DL (ref 31.5–35.7)
MCV RBC AUTO: 91 FL (ref 79–97)
MONOCYTES # BLD AUTO: 0.3 X10E3/UL (ref 0.1–0.9)
MONOCYTES NFR BLD AUTO: 5 %
NEUTROPHILS # BLD AUTO: 3.4 X10E3/UL (ref 1.4–7)
NEUTROPHILS NFR BLD AUTO: 59 %
PLATELET # BLD AUTO: 189 X10E3/UL (ref 150–450)
POTASSIUM SERPL-SCNC: 4.1 MMOL/L (ref 3.5–5.2)
PROT SERPL-MCNC: 6.9 G/DL (ref 6–8.5)
RBC # BLD AUTO: 3.9 X10E6/UL (ref 3.77–5.28)
SODIUM SERPL-SCNC: 142 MMOL/L (ref 134–144)
TSH SERPL DL<=0.005 MIU/L-ACNC: 1.57 UIU/ML (ref 0.45–4.5)
VIT B12 SERPL-MCNC: 812 PG/ML (ref 232–1245)
WBC # BLD AUTO: 5.9 X10E3/UL (ref 3.4–10.8)

## 2023-10-16 LAB
ALBUMIN SERPL ELPH-MCNC: 3.8 G/DL (ref 2.9–4.4)
ALBUMIN/GLOB SERPL: 1.2 {RATIO} (ref 0.7–1.7)
ALPHA1 GLOB SERPL ELPH-MCNC: 0.2 G/DL (ref 0–0.4)
ALPHA2 GLOB SERPL ELPH-MCNC: 0.7 G/DL (ref 0.4–1)
B-GLOBULIN SERPL ELPH-MCNC: 1 G/DL (ref 0.7–1.3)
GAMMA GLOB SERPL ELPH-MCNC: 1.2 G/DL (ref 0.4–1.8)
GLOBULIN SER CALC-MCNC: 3.1 G/DL (ref 2.2–3.9)
LABORATORY COMMENT REPORT: NORMAL
M PROTEIN SERPL ELPH-MCNC: NORMAL G/DL
PROT PATTERN SERPL ELPH-IMP: NORMAL

## 2023-10-31 ENCOUNTER — TELEPHONE (OUTPATIENT)
Age: 68
End: 2023-10-31

## 2023-10-31 NOTE — TELEPHONE ENCOUNTER
Pt called requesting a call back regarding her challenges are accelerating. 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 Pt stated she just want to have a conversation on what to do next.      Please call: [phone]

## 2023-10-31 NOTE — TELEPHONE ENCOUNTER
Verified patient with 2 identifiers   Patient had a telehealth visit with you on 10/9/23. She expressed she was itchy all over. She continues with the itchiness and stinging (worse at night, however does have during the day also). 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 She is taking Benadryl ( which she states she is not supposed to take) so she can be up during the day to care for him. Patient very tearful. Stated she completed lab work and has not heard back.  Please advise on next steps

## 2023-11-01 DIAGNOSIS — L29.9 GENERALIZED PRURITUS: Primary | ICD-10-CM

## 2023-11-01 RX ORDER — HYDROXYZINE HYDROCHLORIDE 25 MG/1
TABLET, FILM COATED ORAL
Qty: 20 TABLET | Refills: 0 | Status: SHIPPED | OUTPATIENT
Start: 2023-11-01

## 2023-11-01 NOTE — TELEPHONE ENCOUNTER
Verified patient with 2 identifiers   I had sent her a my chart message about her labs, \" Hi Ms Rustam Salcedo, Your Hemoglobin A1C is slightly elevated (prediabetes) , try to work on improvements to diet, limiting sugars and carbs and this can be lowered, all of the other labs look good. \"   But if she is still itchy I will rx a small amount of Hydroxyzine, that she may take instead of The Benadryl. If it persists, she should see her PCP. EN   Patient verified understanding and was thankful.

## 2024-04-10 ENCOUNTER — OFFICE VISIT (OUTPATIENT)
Age: 69
End: 2024-04-10
Payer: MEDICARE

## 2024-04-10 VITALS
SYSTOLIC BLOOD PRESSURE: 142 MMHG | WEIGHT: 173 LBS | BODY MASS INDEX: 27.1 KG/M2 | HEART RATE: 55 BPM | DIASTOLIC BLOOD PRESSURE: 82 MMHG | OXYGEN SATURATION: 98 %

## 2024-04-10 DIAGNOSIS — M25.50 ARTHRALGIA OF MULTIPLE SITES: ICD-10-CM

## 2024-04-10 DIAGNOSIS — R20.2 NUMBNESS AND TINGLING OF BOTH FEET: Primary | ICD-10-CM

## 2024-04-10 DIAGNOSIS — M48.062 LUMBAR STENOSIS WITH NEUROGENIC CLAUDICATION: ICD-10-CM

## 2024-04-10 DIAGNOSIS — R20.0 NUMBNESS AND TINGLING OF BOTH FEET: Primary | ICD-10-CM

## 2024-04-10 PROCEDURE — 1123F ACP DISCUSS/DSCN MKR DOCD: CPT | Performed by: NURSE PRACTITIONER

## 2024-04-10 PROCEDURE — G8400 PT W/DXA NO RESULTS DOC: HCPCS | Performed by: NURSE PRACTITIONER

## 2024-04-10 PROCEDURE — G8419 CALC BMI OUT NRM PARAM NOF/U: HCPCS | Performed by: NURSE PRACTITIONER

## 2024-04-10 PROCEDURE — 1036F TOBACCO NON-USER: CPT | Performed by: NURSE PRACTITIONER

## 2024-04-10 PROCEDURE — 3017F COLORECTAL CA SCREEN DOC REV: CPT | Performed by: NURSE PRACTITIONER

## 2024-04-10 PROCEDURE — G8427 DOCREV CUR MEDS BY ELIG CLIN: HCPCS | Performed by: NURSE PRACTITIONER

## 2024-04-10 PROCEDURE — 1090F PRES/ABSN URINE INCON ASSESS: CPT | Performed by: NURSE PRACTITIONER

## 2024-04-10 PROCEDURE — 99214 OFFICE O/P EST MOD 30 MIN: CPT | Performed by: NURSE PRACTITIONER

## 2024-04-10 ASSESSMENT — ENCOUNTER SYMPTOMS
COLOR CHANGE: 1
BACK PAIN: 1

## 2024-04-10 NOTE — PROGRESS NOTES
Increase has helped with the sleep, said she couldn't take full dose in am, states she take 300mg in the am and 600mg in the pm  Unsure if med is working better.   Patient stated reciting scripture helps more     Mentioned her hands have been getting a really cold sensation and a discolored look in her toes  
    Coordination:   Finger to nose and rapid arm movement testing was normal.  No resting or intention tremor.  Negative Romberg, negative pronator drift.      Gait and Station:  Steady while walking on toes, heels, slight difficulty with tandem walking.  Normal arm swing.      Reflexes:  DTRs 1+ in bilateral biceps, brachioradialis, patella and ankle.  No clonus noted.    ASSESSMENT AND PLAN     Diagnosis Orders   1. Numbness and tingling of both feet  [facility] Isael Kaye MD, Neurology, [place]      2. Lumbar stenosis with neurogenic claudication  [facility] Isael Kaye MD, Neurology, [place]      3. Arthralgia of multiple sites          1.  Numbness tingling of bilateral feet: At this time we will proceed with EMG testing, literature was provided on the procedure.  All labs that were ordered at last visit have been reviewed with patient.  Discussed the importance of maintaining healthy lifestyle and keeping her hemoglobin A1c within normal range.  Patient is to continue gabapentin 600 mg twice a day, no adjustments have been made.  Will modify plan of care based upon results of EMG testing.  Patient is to contact us for any worsening signs or symptoms.  2.  Lumbar stenosis: Lumbar MRI has been reviewed with patient, EMG testing to be completed for further evaluation of her symptoms.  Discussed the importance of regular exercise to help minimize her symptoms.  3.  Arthralgias of multiple sites: Patient notes she is seen by rheumatologist, discussed patient needs to follow up with that provider for further evaluation of some of the hand pain and symptoms she is experiencing.  Discussed patient may benefit from Voltaren gel, as patient notes she cannot take NSAIDs due to acid reflux.    Patient and/or family verbalized understand of all instructions and all questions/concerns were addressed.  Safety/side effects of medications discussed.    Patient remains a complex patient secondary to polypharmacy,

## 2024-04-15 RX ORDER — FOLIC ACID 1 MG/1
1 TABLET ORAL DAILY
Qty: 30 TABLET | Refills: 5 | Status: SHIPPED | OUTPATIENT
Start: 2024-04-15

## 2024-05-23 ENCOUNTER — PROCEDURE VISIT (OUTPATIENT)
Age: 69
End: 2024-05-23
Payer: MEDICARE

## 2024-05-23 DIAGNOSIS — M79.661 PAIN IN BOTH LOWER LEGS: ICD-10-CM

## 2024-05-23 DIAGNOSIS — R20.2 NUMBNESS AND TINGLING OF BOTH LEGS BELOW KNEES: Primary | ICD-10-CM

## 2024-05-23 DIAGNOSIS — R20.0 NUMBNESS AND TINGLING OF BOTH LEGS BELOW KNEES: Primary | ICD-10-CM

## 2024-05-23 DIAGNOSIS — M79.662 PAIN IN BOTH LOWER LEGS: ICD-10-CM

## 2024-05-23 DIAGNOSIS — M79.2 NEURALGIA: ICD-10-CM

## 2024-05-23 PROCEDURE — 95910 NRV CNDJ TEST 7-8 STUDIES: CPT | Performed by: PSYCHIATRY & NEUROLOGY

## 2024-05-23 PROCEDURE — 95886 MUSC TEST DONE W/N TEST COMP: CPT | Performed by: PSYCHIATRY & NEUROLOGY

## 2024-05-23 NOTE — PROGRESS NOTES
ELECTRODIANOSTIC REPORT  Test Date:  2024    Patient: Makenzie Noriega : 1955 Physician: Dr. Barakat   Sex: Female Tech: Agueda Gonzalez, EMG Technician  Ref Phys: Neena Cardoso NP     CHIEF COMPLAINTS:  Patient is a 68 year-old female who presents with disturbance, numbness and tingling in her bilateral lower extremity.  Her strength is 5 out of 5.  Sensation was intact to pinprick.  Normal gait      EMG & NCV Findings:  Nerve conduction studies as listed below in the bilateral lower extremities were normal.  There is no significant side-to-side difference    Disposable concentric needle examination of the muscles listed below in the bilateral lower extremity and lumbar paraspinal musculature were normal.    Impression:    This study was normal.  There was no electrodiagnostic evidence upon today's examination suggesting a focal or generalized large fiber neuropathy, myopathy, or lumbar radiculopathy.    __________________  Isael Barakat D.O. FAAN        Nerve Conduction Studies  Anti Sensory Summary Table     Stim Site NR Peak (ms) Norm Peak (ms) O-P Amp (µV) Norm O-P Amp Site1 Site2 Dist (cm)   Left Sup Fibular Anti Sensory (Lat ankle)  33.3 °C   Lower leg    2.5 <4.6 6.3 >4 Lower leg Lat ankle 10.0   Right Sup Fibular Anti Sensory (Lat ankle)  33.3 °C   Lower leg    2.5 <4.6 10.2 >4 Lower leg Lat ankle 10.0   Left Sural Anti Sensory (Lat Mall)  33.3 °C   Calf    3.2 <4.4 9.2 >6 Calf Lat Mall 14.0   Right Sural Anti Sensory (Lat Mall)  33.3 °C   Calf    3.3 <4.4 7.6 >6 Calf Lat Mall 14.0     Motor Summary Table     Stim Site NR Onset (ms) Norm Onset (ms) O-P* Amp (mV) Norm O-P Amp P-T Amp (mV) Site1 Site2 Dist (cm) Bakari (m/s)   Left Fibular Motor (Ext Dig Brev)  33.3 °C   Ankle    3.2 <6.5 4.1 >1.1  Ankle Ext Dig Brev 8.0    B Fib    10.5  3.3   B Fib Ankle 36.0 49   Poplt    12.3  2.9   Poplt B Fib 10.0 56   Right Fibular Motor (Ext Dig Brev)  33.3 °C   Ankle    2.9 <6.5 7.7 >1.1  Ankle Ext Dig Brev 8.0

## 2024-09-11 RX ORDER — FOLIC ACID 1 MG/1
1000 TABLET ORAL DAILY
Qty: 90 TABLET | Refills: 1 | Status: SHIPPED | OUTPATIENT
Start: 2024-09-11

## 2024-10-10 ENCOUNTER — OFFICE VISIT (OUTPATIENT)
Age: 69
End: 2024-10-10
Payer: MEDICARE

## 2024-10-10 VITALS
WEIGHT: 173.3 LBS | DIASTOLIC BLOOD PRESSURE: 70 MMHG | SYSTOLIC BLOOD PRESSURE: 114 MMHG | HEART RATE: 64 BPM | RESPIRATION RATE: 18 BRPM | BODY MASS INDEX: 27.2 KG/M2 | OXYGEN SATURATION: 98 % | HEIGHT: 67 IN

## 2024-10-10 DIAGNOSIS — M48.062 LUMBAR STENOSIS WITH NEUROGENIC CLAUDICATION: ICD-10-CM

## 2024-10-10 DIAGNOSIS — R20.2 NUMBNESS AND TINGLING OF BOTH FEET: Primary | ICD-10-CM

## 2024-10-10 DIAGNOSIS — R51.9 CHRONIC DAILY HEADACHE: ICD-10-CM

## 2024-10-10 DIAGNOSIS — R42 LIGHTHEADED: ICD-10-CM

## 2024-10-10 DIAGNOSIS — R20.0 NUMBNESS AND TINGLING OF BOTH FEET: Primary | ICD-10-CM

## 2024-10-10 DIAGNOSIS — H53.9 VISUAL DISTURBANCE: ICD-10-CM

## 2024-10-10 PROCEDURE — G8484 FLU IMMUNIZE NO ADMIN: HCPCS | Performed by: NURSE PRACTITIONER

## 2024-10-10 PROCEDURE — G8419 CALC BMI OUT NRM PARAM NOF/U: HCPCS | Performed by: NURSE PRACTITIONER

## 2024-10-10 PROCEDURE — 1090F PRES/ABSN URINE INCON ASSESS: CPT | Performed by: NURSE PRACTITIONER

## 2024-10-10 PROCEDURE — G8427 DOCREV CUR MEDS BY ELIG CLIN: HCPCS | Performed by: NURSE PRACTITIONER

## 2024-10-10 PROCEDURE — G8400 PT W/DXA NO RESULTS DOC: HCPCS | Performed by: NURSE PRACTITIONER

## 2024-10-10 PROCEDURE — 3017F COLORECTAL CA SCREEN DOC REV: CPT | Performed by: NURSE PRACTITIONER

## 2024-10-10 PROCEDURE — 1036F TOBACCO NON-USER: CPT | Performed by: NURSE PRACTITIONER

## 2024-10-10 PROCEDURE — 1123F ACP DISCUSS/DSCN MKR DOCD: CPT | Performed by: NURSE PRACTITIONER

## 2024-10-10 PROCEDURE — 99214 OFFICE O/P EST MOD 30 MIN: CPT | Performed by: NURSE PRACTITIONER

## 2024-10-10 RX ORDER — AMLODIPINE BESYLATE 5 MG/1
5 TABLET ORAL DAILY
COMMUNITY
Start: 2024-09-09

## 2024-10-10 RX ORDER — VITAMIN B COMPLEX
1000 TABLET ORAL DAILY
COMMUNITY

## 2024-10-10 RX ORDER — ROSUVASTATIN CALCIUM 5 MG/1
5 TABLET, COATED ORAL DAILY
COMMUNITY

## 2024-10-10 ASSESSMENT — PATIENT HEALTH QUESTIONNAIRE - PHQ9
SUM OF ALL RESPONSES TO PHQ9 QUESTIONS 1 & 2: 2
SUM OF ALL RESPONSES TO PHQ QUESTIONS 1-9: 2
SUM OF ALL RESPONSES TO PHQ QUESTIONS 1-9: 0
2. FEELING DOWN, DEPRESSED OR HOPELESS: SEVERAL DAYS
SUM OF ALL RESPONSES TO PHQ QUESTIONS 1-9: 0
SUM OF ALL RESPONSES TO PHQ QUESTIONS 1-9: 2
SUM OF ALL RESPONSES TO PHQ QUESTIONS 1-9: 0
SUM OF ALL RESPONSES TO PHQ QUESTIONS 1-9: 0
SUM OF ALL RESPONSES TO PHQ9 QUESTIONS 1 & 2: 0
SUM OF ALL RESPONSES TO PHQ QUESTIONS 1-9: 2
2. FEELING DOWN, DEPRESSED OR HOPELESS: NOT AT ALL
SUM OF ALL RESPONSES TO PHQ QUESTIONS 1-9: 2

## 2024-10-10 ASSESSMENT — ENCOUNTER SYMPTOMS
SINUS PRESSURE: 1
SINUS PAIN: 1
EYES NEGATIVE: 1
NAUSEA: 1

## 2024-10-10 NOTE — PROGRESS NOTES
Chief Complaint   Patient presents with    Numbness     Follow up for numbness/tingling in both feet and hands - severity has lessened      1. Have you been to the ER, urgent care clinic since your last visit?  Hospitalized since your last visit? 9/10/24 - Urgent [facility] [address] for knee injury  2. Have you seen or consulted any other health care providers outside of the [facility] since your last visit?  Include any pap smears or colon screening.  Yes [facility] for knee

## 2024-10-10 NOTE — PROGRESS NOTES
[facility]  [address]  [address]  [place] [address]  Tel: [phone]  Fax: [phone]      Date:  10/10/24     Name:  JOSHUA BRYAN  :  1955  MRN:  [id number]     PCP:  Casandra Leal DO    Chief Complaint   Patient presents with    Numbness     Follow up for numbness/tingling in both feet and hands - severity has lessened        HISTORY OF PRESENT ILLNESS:  Patient presents today for regular follow-up, last seen 2024.    Since last seen patient did have EMG testing with Dr Barakat, noted:This study was normal. There was no electrodiagnostic evidence upon today's examination suggesting a focal or generalized large fiber neuropathy, myopathy, or lumbar radiculopathy   She notes the numbness and tingling in her feet is not as bad.  She notices it much more when it is cold outside.  She does complain of a lot of other pains, wonders if it is related to her fibromyalgia, she also complains of a lot of joint pain.  PCP referred to a cardiologist for HTN and bradycardia, she was also  having headaches and SOB when going up the stairs and then she would black out.  Patient denies any loss of consciousness or syncope, she notes truly her vision would just go dark, longest episode was 30 seconds, usually 5-10 seconds. She can hear a heart beat in her ears.  Patient notes ever since she was placed on amlodipine these episodes have improved, she does note that if she \"belly roll\" laughs she will still black out. Stress test and Echo next week. She has worn an event monitor, per patient it was ok.  She also complains of newer onset daily headaches across the top of her head and sometimes in her \"sinus\" area. Used to get migraines, these symptoms are different.   Gabapentin 600mg: she had run out, so she lowered it to 1/2 tablet BID and doing ok so far    Recap from last visit :   1.  Numbness tingling of bilateral feet: At this time we will proceed with EMG testing,

## 2024-10-31 ENCOUNTER — HOSPITAL ENCOUNTER (OUTPATIENT)
Facility: HOSPITAL | Age: 69
Discharge: HOME | End: 2024-11-03
Payer: MEDICARE

## 2024-10-31 DIAGNOSIS — R42 LIGHTHEADED: ICD-10-CM

## 2024-10-31 DIAGNOSIS — H53.9 VISUAL DISTURBANCE: ICD-10-CM

## 2024-10-31 DIAGNOSIS — R51.9 CHRONIC DAILY HEADACHE: ICD-10-CM

## 2024-10-31 PROCEDURE — 70553 MRI BRAIN STEM W/O & W/DYE: CPT

## 2024-10-31 PROCEDURE — 6360000004 HC RX CONTRAST MEDICATION: Performed by: INTERNAL MEDICINE

## 2024-10-31 PROCEDURE — 70498 CT ANGIOGRAPHY NECK: CPT

## 2024-10-31 PROCEDURE — 6360000004 HC RX CONTRAST MEDICATION: Performed by: STUDENT IN AN ORGANIZED HEALTH CARE EDUCATION/TRAINING PROGRAM

## 2024-10-31 PROCEDURE — A9579 GAD-BASE MR CONTRAST NOS,1ML: HCPCS | Performed by: INTERNAL MEDICINE

## 2024-10-31 RX ORDER — IOPAMIDOL 755 MG/ML
100 INJECTION, SOLUTION INTRAVASCULAR
Status: COMPLETED | OUTPATIENT
Start: 2024-10-31 | End: 2024-10-31

## 2024-10-31 RX ADMIN — IOPAMIDOL 100 ML: 755 INJECTION, SOLUTION INTRAVENOUS at 12:58

## 2024-10-31 RX ADMIN — GADOTERIDOL 16 ML: 279.3 INJECTION, SOLUTION INTRAVENOUS at 12:36

## 2024-11-05 ENCOUNTER — ANESTHESIA EVENT (OUTPATIENT)
Facility: HOSPITAL | Age: 69
End: 2024-11-05
Payer: MEDICARE

## 2024-11-05 NOTE — ANESTHESIA PRE PROCEDURE
Department of Anesthesiology  Preprocedure Note       Name:  Makenzie Noriega   Age:  69 y.o.  :  1955                                          MRN:  [id number]         Date:  2024      Surgeon: Surgeon(s):  Antonio Olivier Jr., MD    Procedure: Procedure(s):  COLONOSCOPY BIOPSY  ESOPHAGOGASTRODUODENOSCOPY    Medications prior to admission:   Prior to Admission medications    Medication Sig Start Date End Date Taking? Authorizing Provider   amLODIPine (NORVASC) 5 MG tablet Take 1 tablet by mouth daily 24   Ashkan Mtz MD   rosuvastatin (CRESTOR) 5 MG tablet Take 1 tablet by mouth daily    Ashkan Mtz MD   Vitamin D (CHOLECALCIFEROL) 25 MCG (1000 UT) TABS tablet Take 1 tablet by mouth daily    Ashkan Mtz MD   folic acid (FOLVITE) 1 MG tablet TAKE 1 TABLET BY MOUTH EVERY DAY 24   Neena Cardoso APRN - NP   Acetaminophen (TYLENOL 8 HOUR PO) Take by mouth    Ashkan Mtz MD   gabapentin (NEURONTIN) 600 MG tablet Take 1 tablet by mouth 2 times daily. Max Daily Amount: 1,200 mg  Patient taking differently: Take 1 tablet by mouth 2 times daily. 1/2 tab in am and full tab at HS 10/9/23 10/16/24  Neena Cardoso APRN - NP   dexlansoprazole (DEXILANT) 60 MG CPDR delayed release capsule Take by mouth daily    Automatic Reconciliation, Ar   famotidine (PEPCID) 20 MG tablet Take 1 tablet by mouth daily as needed    Automatic Reconciliation, Ar   irbesartan (AVAPRO) 300 MG tablet Take 1 tablet by mouth daily    Automatic Reconciliation, Ar       Current medications:    No current facility-administered medications for this encounter.     Current Outpatient Medications   Medication Sig Dispense Refill   • amLODIPine (NORVASC) 5 MG tablet Take 1 tablet by mouth daily     • rosuvastatin (CRESTOR) 5 MG tablet Take 1 tablet by mouth daily     • Vitamin D (CHOLECALCIFEROL) 25 MCG (1000 UT) TABS tablet Take 1 tablet by mouth daily     • folic acid (FOLVITE) 1 MG

## 2024-11-06 ENCOUNTER — HOSPITAL ENCOUNTER (OUTPATIENT)
Facility: HOSPITAL | Age: 69
Setting detail: OUTPATIENT SURGERY
Discharge: HOME | End: 2024-11-06
Attending: INTERNAL MEDICINE | Admitting: INTERNAL MEDICINE
Payer: MEDICARE

## 2024-11-06 ENCOUNTER — ANESTHESIA (OUTPATIENT)
Facility: HOSPITAL | Age: 69
End: 2024-11-06
Payer: MEDICARE

## 2024-11-06 VITALS
RESPIRATION RATE: 17 BRPM | WEIGHT: 169 LBS | DIASTOLIC BLOOD PRESSURE: 55 MMHG | BODY MASS INDEX: 26.53 KG/M2 | SYSTOLIC BLOOD PRESSURE: 107 MMHG | OXYGEN SATURATION: 99 % | HEIGHT: 67 IN | HEART RATE: 61 BPM

## 2024-11-06 PROCEDURE — 2500000003 HC RX 250 WO HCPCS: Performed by: REGISTERED NURSE

## 2024-11-06 PROCEDURE — 3700000000 HC ANESTHESIA ATTENDED CARE: Performed by: INTERNAL MEDICINE

## 2024-11-06 PROCEDURE — 2580000003 HC RX 258: Performed by: INTERNAL MEDICINE

## 2024-11-06 PROCEDURE — 3600007513: Performed by: INTERNAL MEDICINE

## 2024-11-06 PROCEDURE — 3600007503: Performed by: INTERNAL MEDICINE

## 2024-11-06 PROCEDURE — 88305 TISSUE EXAM BY PATHOLOGIST: CPT

## 2024-11-06 PROCEDURE — 6360000002 HC RX W HCPCS: Performed by: REGISTERED NURSE

## 2024-11-06 PROCEDURE — 7100000011 HC PHASE II RECOVERY - ADDTL 15 MIN: Performed by: INTERNAL MEDICINE

## 2024-11-06 PROCEDURE — 7100000010 HC PHASE II RECOVERY - FIRST 15 MIN: Performed by: INTERNAL MEDICINE

## 2024-11-06 PROCEDURE — 3700000001 HC ADD 15 MINUTES (ANESTHESIA): Performed by: INTERNAL MEDICINE

## 2024-11-06 PROCEDURE — 2709999900 HC NON-CHARGEABLE SUPPLY: Performed by: INTERNAL MEDICINE

## 2024-11-06 PROCEDURE — 2720000010 HC SURG SUPPLY STERILE: Performed by: INTERNAL MEDICINE

## 2024-11-06 RX ORDER — SODIUM CHLORIDE 0.9 % (FLUSH) 0.9 %
5-40 SYRINGE (ML) INJECTION PRN
Status: DISCONTINUED | OUTPATIENT
Start: 2024-11-06 | End: 2024-11-06 | Stop reason: HOSPADM

## 2024-11-06 RX ORDER — GABAPENTIN 600 MG/1
300 TABLET ORAL 2 TIMES DAILY
COMMUNITY

## 2024-11-06 RX ORDER — SODIUM CHLORIDE 9 MG/ML
INJECTION, SOLUTION INTRAVENOUS PRN
Status: DISCONTINUED | OUTPATIENT
Start: 2024-11-06 | End: 2024-11-06 | Stop reason: HOSPADM

## 2024-11-06 RX ORDER — SODIUM CHLORIDE 0.9 % (FLUSH) 0.9 %
5-40 SYRINGE (ML) INJECTION EVERY 12 HOURS SCHEDULED
Status: DISCONTINUED | OUTPATIENT
Start: 2024-11-06 | End: 2024-11-06 | Stop reason: HOSPADM

## 2024-11-06 RX ADMIN — LIDOCAINE HYDROCHLORIDE 80 MG: 20 INJECTION, SOLUTION EPIDURAL; INFILTRATION; INTRACAUDAL; PERINEURAL at 10:37

## 2024-11-06 RX ADMIN — PROPOFOL 160 MG: 10 INJECTION, EMULSION INTRAVENOUS at 11:00

## 2024-11-06 RX ADMIN — PROPOFOL 50 MG: 10 INJECTION, EMULSION INTRAVENOUS at 10:37

## 2024-11-06 RX ADMIN — PROPOFOL 30 MG: 10 INJECTION, EMULSION INTRAVENOUS at 10:38

## 2024-11-06 RX ADMIN — SODIUM CHLORIDE: 9 INJECTION, SOLUTION INTRAVENOUS at 10:30

## 2024-11-06 ASSESSMENT — PAIN - FUNCTIONAL ASSESSMENT: PAIN_FUNCTIONAL_ASSESSMENT: NONE - DENIES PAIN

## 2024-11-06 NOTE — PROGRESS NOTES
Endoscopy Case End Note:    1045:  EGD Procedure scope was pre-cleaned, per protocol, at bedside by Tammi Zambrano.    1101:  COLON Procedure scope was pre-cleaned, per protocol, at bedside by Tammi Zambrano.      1104:  Report received from anesthesia - Shantell Martinez CRNA.  See anesthesia flowsheet for intra-procedure vital signs and events.

## 2024-11-06 NOTE — PROGRESS NOTES
ARRIVAL INFORMATION:  Verified patient name and date of birth, scheduled procedure, and informed consent.     : tex Cokerer, contact number: [phone]  Physician and staff can share information with the .     Receive texts: NO    Belongings with patient include:  Clothing,None    GI FOCUSED ASSESSMENT:  Neuro: Awake, alert, oriented x4  Respiratory: even and unlabored   GI: soft and non-distended  EKG Rhythm: sinus bradycardia    Education:Reviewed general discharge instructions and  information.

## 2024-11-06 NOTE — PERIOP NOTE
CRE balloon dilatation of the esophagus   18 mm Balloon inflated to 3 ATMs and held for 30 seconds.  19 mm Balloon inflated to 4.5 ATMs and held for 30 seconds.  60 mm Balloon inflated to 6 ATMs and held for 60 seconds.    No subcutaneous crepitus of the chest or cervical region was noted post dilatation.

## 2024-11-06 NOTE — OP NOTE
[facility]                  Colonoscopy Operative Report    11/6/2024      Makenzie Noriega  [id number]  1955    Procedure Type:   Colonoscopy --screening     Indications:    Family history of coloretal cancer (screening only)     Pre-operative Diagnosis: see indication above    Post-operative Diagnosis:  See findings below    :  MATTHIAS Olivier Jr, MD    Referring Provider: Casandra Leal DO      Sedation:  MAC anesthesia Propofol    Pre-Procedural Exam:      Airway: clear,  No airway problems anticipated  Heart: RRR, without gallops or rubs  Lungs: clear bilaterally without wheezes, crackles, or rhonchi  Abdomen: soft, nontender, nondistended, bowel sounds present  Mental Status: awake, alert and oriented to person, place and time     Procedure Details:  After informed consent was obtained with all risks and benefits of procedure explained and preoperative exam completed, the patient was taken to the endoscopy suite and placed in the left lateral decubitus position.  Upon sequential sedation as per above, a digital rectal exam was performed .  The Olympus videocolonoscope  was inserted in the rectum and carefully advanced to the cecum, which was identified by the ileocecal valve and appendiceal orifice.  The cecum was identified by the ileocecal valve and appendiceal orifice.  The quality of preparation was good.  The colonoscope was slowly withdrawn with careful evaluation between folds. Retroflexion in the rectum was completed demonstrating internal hemorrhoids.     Findings:   Rectum: Grade 1 internal hemorrhoid(s);  Sigmoid:     -Diverticulosis  Descending Colon:     -Diverticulosis  Transverse Colon: normal  Ascending Colon: normal  Cecum: normal  Terminal Ileum: not intubated      Specimen Removed:  none    Complications: None.     EBL:  None.    Impression:    normal colonic mucosa throughout  diverticulosis,  Moderate in degree, involving the descending

## 2024-11-06 NOTE — DISCHARGE INSTRUCTIONS
CONSTANCE Olivier MD  [facility]  [address], [address]  [place], [place] [address] [phone]  [email or web address]    Makenzie Noriega  [id number]  1955    EGD DISCHARGE INSTRUCTIONS    Discomfort:  Sore throat- throat lozenges or warm salt water gargle  redness at IV site- apply warm compress to area; if redness or soreness persist- contact your physician  Gaseous discomfort- walking, belching will help relieve any discomfort  You may not operate a vehicle for 12 hours  You may not engage in an occupation involving machinery or appliances for rest of today  You may not drink alcoholic beverages for at least 12 hours  Avoid making any critical decisions for at least 24 hour  DIET  You may have anything by mouth.  You may eat and drink immediately.  You may resume your regular diet - however -  remember your colon is empty and a heavy meal will produce gas.   Avoid these foods:  vegetables, fried / greasy foods, carbonated drinks      ACTIVITY  You may resume your normal daily activities   Spend the remainder of the day resting -  avoid any strenuous activity.    CALL M.D.  ANY SIGN OF   Increasing pain, nausea, vomiting  Abdominal distension (swelling)  New increased bleeding (oral or rectal)  Fever (chills)  Pain in chest area  Bloody discharge from nose or mouth  Shortness of breath    Follow-up Instructions:   Call Dr. MATTHIAS Olivier for any questions or problems.  Telephone # [phone]  Dr. Olivier's office will notify you of the biopsy results available  Within 7 to 10 days. We will call you or send a letter   Continue same medications.    ENDOSCOPY FINDINGS:   Your endoscopy showed some mild esophagitis. No stricture seen, but we dilated the whole esophagus  Stomach and duodenum were normal.      DISCHARGE SUMMARY from Nurse    The following personal items collected during your admission are returned to you:   Dental Appliance:    Vision:

## 2024-11-06 NOTE — H&P
CONSTANCE Olivier MD  [facility]  [address], [address]  [place] [address] [phone]  [email or web address]    Gastroenterology Outpatient History and Physical    Patient: Makenzie Noriega    Physician: MATTHIAS Olivier MD    Vital Signs: Blood pressure (!) 145/68, pulse 57, resp. rate 14, height 1.702 m (5' 7\"), weight 76.7 kg (169 lb), SpO2 100%.    Allergies:   Allergies   Allergen Reactions    Nuts [Peanut-Containing Drug Products] Hives    Penicillins Hives     Has had rocephin and ancef without reaction.       Chief Complaint: Screening colonoscopy and acid reflux    History of Present Illness:        Makenzie Noriega is a 69 y.o. female who presents for reflux.    Had a flare up of reflux when she made the appt. Feels acid coming back up in the back of throat and through her nose. Does still feel periodic sensation of food getting stuck in mid esophagus.     Will have a flare up about every other month, though becoming worse when they happen - lasting longer, harder to control. Eating habits have changed over the past year as a loved one passed away. Has lost about 15-20lbs over the last couple years.     When having a flare, will add famotidine and take gaviscon as needed which does help.     No NSAID use.    EGD 8/2022 with reflux esophagitis, benign gastric polyp, mild gastritis, s/p empiric dilation with 54 FR Boyce  CLN 7/2019 with grade 1 internal hemorrhoids, normal mucosa otherwise - FU rec 5yrs       History:  Past Medical History:   Diagnosis Date    Acid indigestion     Anxiety     Gastrointestinal disorder     acid reflux    Headache     Hypertension     Other ill-defined conditions(799.89)     fibromyalgia      Past Surgical History:   Procedure Laterality Date    ERCP REMOVE CALCULI/DEBRIS BILIARY/PANCREAS DUCT  1/18/2017         ERCP W/SPHINCTEROTOMY/PAPILLOTOMY  1/18/2017         ESOPHAGEAL MOTILITY STUDY W/INTERP&RPT

## 2024-11-06 NOTE — OP NOTE
[facility]                   Endoscopic Gastroduodenoscopy Procedure Note      11/6/2024  Makenzie Noriega  1955  [id number]    Procedure: Endoscopic Gastroduodenoscopy with biopsy, esophageal dilation    Indication: Dyphagia/odynophagia, GERD     Pre-operative Diagnosis: see indication above    Post-operative Diagnosis: see findings below    : CONSTANCE Olivier Jr. MD    Referring Provider:  Casandra Leal DO      Anesthesia/Sedation:  MAC anesthesia Propofol    Airway assessment: No airway problems anticipated    Pre-Procedural Exam:      Airway: clear, no airway problems anticipated  Heart: RRR, without gallops or rubs  Lungs: clear bilaterally without wheezes, crackles, or rhonchi  Abdomen: soft, nontender, nondistended, bowel sounds present  Mental Status: awake, alert and oriented to person, place and time       Procedure Details     After infomed consent was obtained for the procedure, with all risks and benefits of procedure explained the patient was taken to the endoscopy suite and placed in the left lateral decubitus position.  Following sequential administration of sedation as per above, the endoscope was inserted into the mouth and advanced under direct vision to second portion of the duodenum.  A careful inspection was made as the gastroscope was withdrawn, including a retroflexed view of the proximal stomach; findings and interventions are described below.      Findings:   Esophagus:  normal mucosa in the upper and mid. Focal esophagitis at the GE junction, biopsied. No stricture or ring. Empiric dilation of whole esophagus from 18 to 20 mm  Stomach: normal   Duodenum: normal    Therapies:  esophageal dilation with 18-20 mm sized balloon  biopsy of esophagus    Specimens:  Biopsies of GE junction           Complications:   None; patient tolerated the procedure well.    EBL:  None.            Impression:      Mild esophagitis  Empiric esophageal

## 2024-11-06 NOTE — ANESTHESIA POSTPROCEDURE EVALUATION
Department of Anesthesiology  Postprocedure Note    Patient: Makenzie Noriega  MRN: [id number]  YOB: 1955  Date of evaluation: 11/6/2024    Procedure Summary       Date: 11/06/24 Room / Location: [facility] ENDO 04 / [facility] ENDOSCOPY    Anesthesia Start: 1030 Anesthesia Stop: 1105    Procedures:       COLONOSCOPY BIOPSY (Lower GI Region)      ESOPHAGOGASTRODUODENOSCOPY (Upper GI Region) Diagnosis:       Family history of colonic polyps      (Family history of colonic polyps [Z83.719])    Surgeons: Antonio Olivier Jr., MD Responsible Provider: BALTA Booker MD    Anesthesia Type: MAC ASA Status: 2            Anesthesia Type: MAC    Nelly Phase I: Nelly Score: 10    Nelly Phase II: Nelly Score: 10    Anesthesia Post Evaluation    Patient location during evaluation: bedside  Patient participation: complete - patient participated  Level of consciousness: responsive to verbal stimuli and awake and alert  Pain score: 2  Nausea & Vomiting: no nausea  Cardiovascular status: blood pressure returned to baseline  Respiratory status: acceptable  Hydration status: euvolemic  Multimodal analgesia pain management approach  Pain management: adequate    No notable events documented.

## 2024-11-22 ENCOUNTER — TELEPHONE (OUTPATIENT)
Age: 69
End: 2024-11-22

## 2024-11-22 NOTE — TELEPHONE ENCOUNTER
Patient is asking for her results from the MRI, and she is also asking for a copy of her results. Please call her back at . Thank you.

## 2024-11-25 NOTE — TELEPHONE ENCOUNTER
Verified patient with 2 identifiers   Patient states she does not use my chart. I read the results that you posted on her my chart regarding the CTA. She does want a referral to vascular. Please place

## 2024-11-26 DIAGNOSIS — I77.3 FIBROMUSCULAR DYSPLASIA (HCC): Primary | ICD-10-CM

## 2024-11-26 NOTE — TELEPHONE ENCOUNTER
Faxed referral, last OV note and copy of MRI and CTA results to Dr Ag Sampson at [phone]. Received confirmation that fax went through successfully   Put in quick scan

## 2025-04-15 ENCOUNTER — OFFICE VISIT (OUTPATIENT)
Age: 70
End: 2025-04-15
Payer: MEDICARE

## 2025-04-15 VITALS
BODY MASS INDEX: 27.25 KG/M2 | WEIGHT: 173.6 LBS | HEIGHT: 67 IN | HEART RATE: 51 BPM | SYSTOLIC BLOOD PRESSURE: 132 MMHG | RESPIRATION RATE: 18 BRPM | OXYGEN SATURATION: 100 % | DIASTOLIC BLOOD PRESSURE: 70 MMHG

## 2025-04-15 DIAGNOSIS — R20.2 NUMBNESS AND TINGLING OF BOTH FEET: Primary | ICD-10-CM

## 2025-04-15 DIAGNOSIS — R42 LIGHTHEADED: ICD-10-CM

## 2025-04-15 DIAGNOSIS — R20.0 NUMBNESS AND TINGLING OF BOTH FEET: Primary | ICD-10-CM

## 2025-04-15 DIAGNOSIS — M79.2 NEURALGIA: ICD-10-CM

## 2025-04-15 DIAGNOSIS — R20.9 COLD HANDS: ICD-10-CM

## 2025-04-15 PROCEDURE — 1036F TOBACCO NON-USER: CPT | Performed by: NURSE PRACTITIONER

## 2025-04-15 PROCEDURE — 1123F ACP DISCUSS/DSCN MKR DOCD: CPT | Performed by: NURSE PRACTITIONER

## 2025-04-15 PROCEDURE — 3017F COLORECTAL CA SCREEN DOC REV: CPT | Performed by: NURSE PRACTITIONER

## 2025-04-15 PROCEDURE — G8427 DOCREV CUR MEDS BY ELIG CLIN: HCPCS | Performed by: NURSE PRACTITIONER

## 2025-04-15 PROCEDURE — 1160F RVW MEDS BY RX/DR IN RCRD: CPT | Performed by: NURSE PRACTITIONER

## 2025-04-15 PROCEDURE — 1090F PRES/ABSN URINE INCON ASSESS: CPT | Performed by: NURSE PRACTITIONER

## 2025-04-15 PROCEDURE — 1159F MED LIST DOCD IN RCRD: CPT | Performed by: NURSE PRACTITIONER

## 2025-04-15 PROCEDURE — G8400 PT W/DXA NO RESULTS DOC: HCPCS | Performed by: NURSE PRACTITIONER

## 2025-04-15 PROCEDURE — 1125F AMNT PAIN NOTED PAIN PRSNT: CPT | Performed by: NURSE PRACTITIONER

## 2025-04-15 PROCEDURE — 99214 OFFICE O/P EST MOD 30 MIN: CPT | Performed by: NURSE PRACTITIONER

## 2025-04-15 PROCEDURE — G8419 CALC BMI OUT NRM PARAM NOF/U: HCPCS | Performed by: NURSE PRACTITIONER

## 2025-04-15 RX ORDER — GABAPENTIN 600 MG/1
600 TABLET ORAL 2 TIMES DAILY
Qty: 60 TABLET | Refills: 3 | Status: SHIPPED | OUTPATIENT
Start: 2025-04-15 | End: 2026-04-23

## 2025-04-15 RX ORDER — AMOXICILLIN 500 MG
1200 CAPSULE ORAL DAILY
COMMUNITY

## 2025-04-15 ASSESSMENT — PATIENT HEALTH QUESTIONNAIRE - PHQ9
1. LITTLE INTEREST OR PLEASURE IN DOING THINGS: NOT AT ALL
SUM OF ALL RESPONSES TO PHQ QUESTIONS 1-9: 0
2. FEELING DOWN, DEPRESSED OR HOPELESS: NOT AT ALL

## 2025-04-15 NOTE — PROGRESS NOTES
Chief Complaint   Patient presents with    Neurologic Problem     Numbness and tingling in both hands and feet - hands get extremely cold      1. Have you been to the ER, urgent care clinic since your last visit?  Hospitalized since your last visit? No     2. Have you seen or consulted any other health care providers outside of the [facility] since your last visit?  Include any pap smears or colon screening.  Yes [facility] for left foot fx

## 2025-04-15 NOTE — PROGRESS NOTES
[facility]  [address]  [address]  [place] [address]  Tel: [phone]  Fax: [phone]      Date:  04/15/25     Name:  JOSHUA BRYAN  :  1955  MRN:  [id number]     PCP:  Casandra Leal DO    Chief Complaint   Patient presents with    Neurologic Problem     Numbness and tingling in both hands and feet - hands get extremely cold        HISTORY OF PRESENT ILLNESS:  History of Present Illness  The patient is a 69-year-old female here today for a regular follow-up. The patient was last seen in 2024. She has had previous EMG testing which was found to be normal. The patient continues to experience numbness and tingling in her feet. We had briefly discussed a skin biopsy. This was ultimately deferred. The patient was having headaches as well as some experiences of lightheadedness and dizziness based upon that MRI and CTA were ordered. Brain MRI was completed 2024 mild/minimal chronic microvascular ischemic disease noted head and neck CTA was completed as well. Very minimal stenosis was noted. Possible findings of a fibromuscular dysplasia discussed possible referral to vascular for further evaluation. Ultimately she was referred to Dr. Bronson Sampson.  She did not see a vascular specialist, she notes she is seen by cardiology Dr Marti.  Unknown whether they reviewed the findings of the possible fibromuscular dysplasia.  She notes that they adjusted some of her medications and told her to follow-up in 1 year.  Patient notes her headaches are significantly better, she feels as though they are directly related to sinus and she has not had any recent episodes of lightheadedness, blackouts, etc.  The patient has recently seen orthopedics for a left fifth metatarsal fracture. Seeing Dr Villaseñor.  Patient notes she injured it while doing some exercises to work on her balance.  Patient notes routinely she does some regular walking but obviously limited right now

## (undated) DEVICE — SUTURE VCRL SZ 3-0 L27IN ABSRB UD L26MM SH 1/2 CIR J416H

## (undated) DEVICE — SEALANT FIBRIN 4 CC FRZN PRE FILLED SYR TISSEEL

## (undated) DEVICE — TOWEL 4 PLY TISS 19X30 SUE WHT

## (undated) DEVICE — Device

## (undated) DEVICE — Device: Brand: ENDO SMARTCAP

## (undated) DEVICE — SOLUTION IV 500ML 0.9% SOD CHL FLX CONT

## (undated) DEVICE — PORT GUIDE CANN SNGL SITE DISP -- DA VINCI

## (undated) DEVICE — ESOPHAGEAL BALLOON DILATATION CATHETER: Brand: CRE FIXED WIRE

## (undated) DEVICE — MEDI-VAC YANK SUCT HNDL W/TPRD BULBOUS TIP: Brand: CARDINAL HEALTH

## (undated) DEVICE — HYPODERMIC SAFETY NEEDLE: Brand: MAGELLAN

## (undated) DEVICE — NEEDLE HYPO 22GA L1.5IN BLK S STL HUB POLYPR SHLD REG BVL

## (undated) DEVICE — ELECTRODE,RADIOTRANSLUCENT,FOAM,5PK: Brand: MEDLINE

## (undated) DEVICE — GOWN,SIRUS,NONRNF,SETINSLV,XL,20/CS: Brand: MEDLINE

## (undated) DEVICE — MEDIUM-LARGE CLIP APPLIER: Brand: ENDOWRIST

## (undated) DEVICE — GLOVE SURG SZ 7.5 L11.2IN THK9.8MIL STRW LTX POLYMER BEAD

## (undated) DEVICE — CARTRIDGE CLP LIG HEMLOK GRN --

## (undated) DEVICE — Z INACTIVE NO USAGE TURNOVER KIT RM CLEANOP

## (undated) DEVICE — Z DISCONTINUED PER MEDLINE LINE GAS SAMPLING O2/CO2 LNG AD 13 FT NSL W/ TBNG FILTERLINE

## (undated) DEVICE — DRAPE,REIN 53X77,STERILE: Brand: MEDLINE

## (undated) DEVICE — DERMABOND SKIN ADH 0.7ML -- DERMABOND ADVANCED 12/BX

## (undated) DEVICE — (D)PREP SKN CHLRAPRP APPL 26ML -- CONVERT TO ITEM 371833

## (undated) DEVICE — RETRIEVAL BALLOON CATHETER: Brand: EXTRACTOR™ PRO XL

## (undated) DEVICE — CATHETER URET 4FR L70CM POLYUR OLV FLX TIP KINK RESIST W/

## (undated) DEVICE — CATH IV AUTOGRD BC PNK 20GA 25 -- INSYTE

## (undated) DEVICE — COVIDIEN KENDALL DL DISPOSABLE 3 LEAD SY: Brand: MEDLINE RENEWAL

## (undated) DEVICE — MARYLAND BIPOLAR FORCEPS: Brand: ENDOWRIST

## (undated) DEVICE — PROGRASP FORCEPS: Brand: ENDOWRIST

## (undated) DEVICE — COVER,TABLE,60X90,STERILE: Brand: MEDLINE

## (undated) DEVICE — SUTURE SZ 0 27IN 5/8 CIR UR-6  TAPER PT VIOLET ABSRB VICRYL J603H

## (undated) DEVICE — BAG SPEC BIOHZRD 10 X 10 IN --

## (undated) DEVICE — CONTAINER SPEC 20 ML LID NEUT BUFF FORMALIN 10 % POLYPR STS

## (undated) DEVICE — SURGICAL PROCEDURE KIT GEN LAPAROSCOPY LF

## (undated) DEVICE — SPHINCTEROTOME: Brand: DREAMTOME™ RX 44

## (undated) DEVICE — ARM DRAPE

## (undated) DEVICE — BLOCK BITE ENDOSCP AD 21 MM W/ DIL BLU LF DISP

## (undated) DEVICE — SET ADMIN 16ML TBNG L100IN 2 Y INJ SITE IV PIGGY BK DISP

## (undated) DEVICE — SOLUTION IV 1000ML 0.9% SOD CHL

## (undated) DEVICE — ENDOSCOPIC KIT COMPLIANCE ENDOKIT

## (undated) DEVICE — BLADE ASSEMB CLP HAIR FINE --

## (undated) DEVICE — CATHETER CHOLGM 4.5FR L18IN W/ MTL SUPP TB

## (undated) DEVICE — COLUMN DRAPE

## (undated) DEVICE — 1200 GUARD II KIT W/5MM TUBE W/O VAC TUBE: Brand: GUARDIAN

## (undated) DEVICE — HANDLE LT SNAP ON ULT DURABLE LENS FOR TRUMPF ALC DISPOSABLE

## (undated) DEVICE — Z CONVERTED USE 2107985 COVER FLROSCP W36XL28IN 4 SIDE ADH

## (undated) DEVICE — NEONATAL-ADULT SPO2 SENSOR: Brand: NELLCOR

## (undated) DEVICE — FORCEPS BX L240CM JAW DIA2.4MM ORNG L CAP W/ NDL DISP RAD

## (undated) DEVICE — SYR 3ML LL TIP 1/10ML GRAD --

## (undated) DEVICE — SOLIDIFIER FLD 2OZ 1500CC N DISINF IN BTL DISP SAFESORB

## (undated) DEVICE — SOLUTION IRRIG 1000ML H2O STRL BLT

## (undated) DEVICE — SYR 10ML LUER LOK 1/5ML GRAD --

## (undated) DEVICE — ERCP CANNULA-TAPERED TIP: Brand: FLUORO TIP ERCP CANNULA

## (undated) DEVICE — KENDALL SCD EXPRESS SLEEVES, KNEE LENGTH, MEDIUM: Brand: KENDALL SCD

## (undated) DEVICE — KENDALL RADIOLUCENT FOAM MONITORING ELECTRODE RECTANGULAR SHAPE: Brand: KENDALL

## (undated) DEVICE — ELECTRODE PT RET AD L9FT HI MOIST COND ADH HYDRGEL CORDED

## (undated) DEVICE — BASIN EMSIS 16OZ GRAPHITE PLAS KID SHP MOLD GRAD FOR ORAL

## (undated) DEVICE — BASIN EMESIS 500CC ROSE 250/CS 60/PLT: Brand: MEDEGEN MEDICAL PRODUCTS, LLC

## (undated) DEVICE — SEAL UNIV 5-8MM DISP BX/10 -- DA VINCI XI - SNGL USE

## (undated) DEVICE — SOLIDIFIER MEDC 1200ML -- CONVERT TO 356117

## (undated) DEVICE — YANKAUER,TAPERED BULBOUS TIP,W/O VENT: Brand: MEDLINE

## (undated) DEVICE — DEVON™ KNEE AND BODY STRAP 60" X 3" (1.5 M X 7.6 CM): Brand: DEVON

## (undated) DEVICE — BITE BLK ENDOSCP AD 54FR GRN POLYETH ENDOSCP W STRP SLD

## (undated) DEVICE — NEEDLE HYPO 18GA L1.5IN PNK S STL HUB POLYPR SHLD REG BVL

## (undated) DEVICE — GUIDEWIRE ENDOSCP WRK L450CM DIA0.035IN STD SHFT STR RND

## (undated) DEVICE — CUFF BLD PRSS AD CLTH SGL TB W/ BAYNT CONN ROUNDED CORNER

## (undated) DEVICE — SUTURE STRATAFIX SPRL MCRYL + SZ 4-0 L12IN ABSRB UD PS-2 SXMP1B117

## (undated) DEVICE — TIP SUCT TRNSPAR RIB SURF STD BLB RIG NVENT W/ 5IN1 CONN DYND50138] MEDLINE INDUSTRIES INC]

## (undated) DEVICE — RADIFOCUS GLIDEWIRE: Brand: GLIDEWIRE

## (undated) DEVICE — FORCEPS BX L160CM DIA8MM GRSP DISECT CUP TIP NONLOCKING ROT

## (undated) DEVICE — SEAL CANN 5MM DAVINCI

## (undated) DEVICE — CATHETER IV 22GA L1IN OD0.8382-0.9144MM ID0.6096-0.6858MM 382523

## (undated) DEVICE — (D)SYR 10ML 1/5ML GRAD NSAF -- PKGING CHANGE USE ITEM 338027

## (undated) DEVICE — APPLICATOR FLEXIBLE 360D 40CM --

## (undated) DEVICE — SYR ASSEMB INFL BLLN 60ML --

## (undated) DEVICE — BLADELESS OBTURATOR: Brand: WECK VISTA

## (undated) DEVICE — NDL PRT INJ NSAF BLNT 18GX1.5 --

## (undated) DEVICE — IV START KIT: Brand: MEDLINE

## (undated) DEVICE — REM POLYHESIVE ADULT PATIENT RETURN ELECTRODE: Brand: VALLEYLAB

## (undated) DEVICE — CUFF BP SOFT LG ADLT 1-TUBE HP --

## (undated) DEVICE — VISUALIZATION SYSTEM: Brand: CLEARIFY

## (undated) DEVICE — SET GRAV CK VLV NEEDLESS ST 3 GANGED 4WAY STPCOCK HI FLO 10

## (undated) DEVICE — TOWEL SURG W17XL27IN STD BLU COT NONFENESTRATED PREWASHED

## (undated) DEVICE — CATHETER IV 14GA L2IN POLYUR STR ORNG HUB SFTY RADPQ DISP

## (undated) DEVICE — DEVICE LCK BILI RAP EXCHG OLPS --

## (undated) DEVICE — CANNULA SEAL

## (undated) DEVICE — Device: Brand: MEDEX

## (undated) DEVICE — KIT DRP 3 ARM ACC DISP ENDOWRIST DA VINCI SI

## (undated) DEVICE — SCISSORS SURG DIA8MM MPLR CRV ENDOWRIST

## (undated) DEVICE — BITEBLOCK 54FR W/ DENT RIM BLOX

## (undated) DEVICE — SEAL CANN F/12MM 8.5-13MM DISP -- DA VINCI

## (undated) DEVICE — SYR 50ML LR LCK 1ML GRAD NSAF --

## (undated) DEVICE — FALCON® SAMPLE CONTAINER, WITH LID, 4.5OZ (110ML), INDIVIDUALLY WRAPPED, STERILE, 100/CASE: Brand: FALCON A CORNING BRAND